# Patient Record
Sex: FEMALE | Race: WHITE | Employment: OTHER | URBAN - METROPOLITAN AREA
[De-identification: names, ages, dates, MRNs, and addresses within clinical notes are randomized per-mention and may not be internally consistent; named-entity substitution may affect disease eponyms.]

---

## 2017-06-22 ENCOUNTER — HOSPITAL ENCOUNTER (OUTPATIENT)
Dept: OTHER | Age: 67
Discharge: HOME | End: 2017-06-22
Attending: FAMILY MEDICINE | Admitting: CLINIC/CENTER

## 2017-06-25 LAB
CULTURE: NORMAL
ORGANISM: NORMAL
REPORT STATUS: NORMAL
REQUEST PROBLEM: NORMAL
SPECIMEN: NORMAL
TOTAL COLONY COUNT: NORMAL

## 2017-08-29 ENCOUNTER — TELEPHONE (OUTPATIENT)
Dept: INTERNAL MEDICINE CLINIC | Age: 67
End: 2017-08-29

## 2017-08-31 ENCOUNTER — OFFICE VISIT (OUTPATIENT)
Dept: PHYSICAL MEDICINE AND REHAB | Age: 67
End: 2017-08-31

## 2017-08-31 DIAGNOSIS — R20.2 PARESTHESIA AND PAIN OF BOTH UPPER EXTREMITIES: ICD-10-CM

## 2017-08-31 DIAGNOSIS — M79.601 PARESTHESIA AND PAIN OF BOTH UPPER EXTREMITIES: ICD-10-CM

## 2017-08-31 DIAGNOSIS — G56.02 CARPAL TUNNEL SYNDROME OF LEFT WRIST: Primary | ICD-10-CM

## 2017-08-31 DIAGNOSIS — M79.602 PARESTHESIA AND PAIN OF BOTH UPPER EXTREMITIES: ICD-10-CM

## 2017-08-31 DIAGNOSIS — R29.898 LEFT HAND WEAKNESS: ICD-10-CM

## 2017-08-31 PROCEDURE — 95886 MUSC TEST DONE W/N TEST COMP: CPT | Performed by: PHYSICAL MEDICINE & REHABILITATION

## 2017-08-31 PROCEDURE — 95909 NRV CNDJ TST 5-6 STUDIES: CPT | Performed by: PHYSICAL MEDICINE & REHABILITATION

## 2019-04-01 ENCOUNTER — HOSPITAL ENCOUNTER (OUTPATIENT)
Dept: CT IMAGING | Age: 69
Discharge: HOME | End: 2019-04-01
Payer: MEDICARE

## 2019-04-01 DIAGNOSIS — F03.90 SENILE DEMENTIA, UNCOMPLICATED (HCC): ICD-10-CM

## 2019-04-01 PROCEDURE — 70450 CT HEAD/BRAIN W/O DYE: CPT

## 2019-09-09 ENCOUNTER — TELEPHONE (OUTPATIENT)
Dept: BARIATRICS/WEIGHT MGMT | Age: 69
End: 2019-09-09

## 2019-09-19 ENCOUNTER — OFFICE VISIT (OUTPATIENT)
Dept: BARIATRICS/WEIGHT MGMT | Age: 69
End: 2019-09-19
Payer: MEDICARE

## 2019-09-19 VITALS
DIASTOLIC BLOOD PRESSURE: 70 MMHG | RESPIRATION RATE: 16 BRPM | WEIGHT: 220.2 LBS | SYSTOLIC BLOOD PRESSURE: 112 MMHG | HEIGHT: 61 IN | HEART RATE: 78 BPM | BODY MASS INDEX: 41.57 KG/M2

## 2019-09-19 DIAGNOSIS — E66.01 MORBID OBESITY WITH BMI OF 40.0-44.9, ADULT (HCC): Primary | ICD-10-CM

## 2019-09-19 PROCEDURE — 3017F COLORECTAL CA SCREEN DOC REV: CPT | Performed by: SURGERY

## 2019-09-19 PROCEDURE — 4040F PNEUMOC VAC/ADMIN/RCVD: CPT | Performed by: SURGERY

## 2019-09-19 PROCEDURE — 1123F ACP DISCUSS/DSCN MKR DOCD: CPT | Performed by: SURGERY

## 2019-09-19 PROCEDURE — G8400 PT W/DXA NO RESULTS DOC: HCPCS | Performed by: SURGERY

## 2019-09-19 PROCEDURE — G8427 DOCREV CUR MEDS BY ELIG CLIN: HCPCS | Performed by: SURGERY

## 2019-09-19 PROCEDURE — 1036F TOBACCO NON-USER: CPT | Performed by: SURGERY

## 2019-09-19 PROCEDURE — G8417 CALC BMI ABV UP PARAM F/U: HCPCS | Performed by: SURGERY

## 2019-09-19 PROCEDURE — 99204 OFFICE O/P NEW MOD 45 MIN: CPT | Performed by: SURGERY

## 2019-09-19 PROCEDURE — 1090F PRES/ABSN URINE INCON ASSESS: CPT | Performed by: SURGERY

## 2019-09-19 ASSESSMENT — ENCOUNTER SYMPTOMS
RESPIRATORY NEGATIVE: 1
ALLERGIC/IMMUNOLOGIC NEGATIVE: 1
CONSTIPATION: 1
BACK PAIN: 1

## 2019-09-19 NOTE — PROGRESS NOTES
Initial Bariatric Surgery Consultation History and Physical    Chief Complaint:  Class III Obesity (BMI greater than or equal to 40.0)    History of Present Illness: The patient is a 80 y.o. female being seen today for initial bariatric surgery consultation. The patient previously attended a bariatric surgery informational seminar on 9/5/19. The patient's PCP is Dr. Arcelia Moore.    The patient first recognized having issues with increased weight ~39-40 years ago. The patient identifies the following precipitants causing, or contributing to, weight gain: After having her two children is when the weight started staying on. Pt also states when she is stressed she \"turns to food. \" Also, family-related health issues (with her mother and father), and her divorce. The patient estimates the lowest weight in the past five years is approximately 175 pounds. The patient estimates the highest weight in the past five years is approximately 220 pounds. The patient's current weight is 220 pounds, while the current BMI is Body mass index is 41.61 kg/m². The patient has not had previous bariatric surgery. The patient has tried the following diet(s):  calorie counting, high protein diet, low carbohydrate diet, portion control, Weight Watchers, \"diet workshop,\" cabbage soup diet. The patient has tried the following over-the-counter drugs and/or prescription weight loss medications: belviq, contrave, and qysmia. Pt also reports trying multiple OTC weight loss supplements and herbs. The patient has tried the following physical activities/exercies: Walking. The patient was  successful with previous dietary, medication, and/or physical activity for sustained weight loss. In 1998 pt states she had a significant amount of weight loss that she attributes to walking and eating \"portion control. \"    In reviewing the patient's typical daily diet, it consists of the following:   Breakfast: An egg, toast or bowl of was counseling on diet, nutrition, surgical options, and education as above documented in my note.     Electronically signed by Beverly Vitale II, MD on 9/19/2019 at 2:38 PM

## 2019-10-01 ENCOUNTER — OFFICE VISIT (OUTPATIENT)
Dept: BARIATRICS/WEIGHT MGMT | Age: 69
End: 2019-10-01

## 2019-10-01 VITALS — BODY MASS INDEX: 41.39 KG/M2 | WEIGHT: 219.2 LBS | HEIGHT: 61 IN

## 2019-10-01 DIAGNOSIS — E66.01 MORBID OBESITY WITH BMI OF 40.0-44.9, ADULT (HCC): Primary | ICD-10-CM

## 2019-10-01 PROCEDURE — 99999 PR OFFICE/OUTPT VISIT,PROCEDURE ONLY: CPT

## 2019-10-09 ENCOUNTER — OFFICE VISIT (OUTPATIENT)
Dept: BARIATRICS/WEIGHT MGMT | Age: 69
End: 2019-10-09

## 2019-10-09 VITALS
DIASTOLIC BLOOD PRESSURE: 75 MMHG | BODY MASS INDEX: 40.33 KG/M2 | SYSTOLIC BLOOD PRESSURE: 145 MMHG | HEART RATE: 70 BPM | WEIGHT: 213.6 LBS | HEIGHT: 61 IN

## 2019-10-09 DIAGNOSIS — E66.01 MORBID OBESITY WITH BMI OF 40.0-44.9, ADULT (HCC): Primary | ICD-10-CM

## 2019-10-09 PROCEDURE — 99999 PR OFFICE/OUTPT VISIT,PROCEDURE ONLY: CPT

## 2019-10-16 ENCOUNTER — OFFICE VISIT (OUTPATIENT)
Dept: BARIATRICS/WEIGHT MGMT | Age: 69
End: 2019-10-16

## 2019-10-16 VITALS
BODY MASS INDEX: 40.02 KG/M2 | SYSTOLIC BLOOD PRESSURE: 136 MMHG | WEIGHT: 212 LBS | HEIGHT: 61 IN | HEART RATE: 76 BPM | DIASTOLIC BLOOD PRESSURE: 82 MMHG

## 2019-10-16 DIAGNOSIS — E66.01 MORBID OBESITY WITH BMI OF 40.0-44.9, ADULT (HCC): Primary | ICD-10-CM

## 2019-10-16 PROCEDURE — 99999 PR OFFICE/OUTPT VISIT,PROCEDURE ONLY: CPT

## 2019-10-23 ENCOUNTER — OFFICE VISIT (OUTPATIENT)
Dept: BARIATRICS/WEIGHT MGMT | Age: 69
End: 2019-10-23

## 2019-10-23 VITALS
DIASTOLIC BLOOD PRESSURE: 85 MMHG | HEIGHT: 61 IN | BODY MASS INDEX: 39.91 KG/M2 | SYSTOLIC BLOOD PRESSURE: 159 MMHG | HEART RATE: 80 BPM | WEIGHT: 211.4 LBS

## 2019-10-23 DIAGNOSIS — E66.9 OBESITY (BMI 30-39.9): Primary | ICD-10-CM

## 2019-10-23 PROCEDURE — 99999 PR OFFICE/OUTPT VISIT,PROCEDURE ONLY: CPT

## 2019-11-01 ENCOUNTER — OFFICE VISIT (OUTPATIENT)
Dept: BARIATRICS/WEIGHT MGMT | Age: 69
End: 2019-11-01

## 2019-11-01 VITALS
HEIGHT: 61 IN | SYSTOLIC BLOOD PRESSURE: 133 MMHG | HEART RATE: 73 BPM | BODY MASS INDEX: 40.17 KG/M2 | WEIGHT: 212.8 LBS | DIASTOLIC BLOOD PRESSURE: 77 MMHG

## 2019-11-01 DIAGNOSIS — E66.01 MORBID OBESITY WITH BMI OF 40.0-44.9, ADULT (HCC): Primary | ICD-10-CM

## 2019-11-01 PROCEDURE — 99999 PR OFFICE/OUTPT VISIT,PROCEDURE ONLY: CPT

## 2019-11-06 ENCOUNTER — OFFICE VISIT (OUTPATIENT)
Dept: BARIATRICS/WEIGHT MGMT | Age: 69
End: 2019-11-06

## 2019-11-06 VITALS
HEIGHT: 61 IN | BODY MASS INDEX: 40.25 KG/M2 | DIASTOLIC BLOOD PRESSURE: 75 MMHG | HEART RATE: 75 BPM | WEIGHT: 213.2 LBS | SYSTOLIC BLOOD PRESSURE: 135 MMHG

## 2019-11-06 DIAGNOSIS — E66.01 MORBID OBESITY WITH BMI OF 40.0-44.9, ADULT (HCC): Primary | ICD-10-CM

## 2019-11-06 PROCEDURE — 99999 PR OFFICE/OUTPT VISIT,PROCEDURE ONLY: CPT

## 2019-11-13 ENCOUNTER — OFFICE VISIT (OUTPATIENT)
Dept: BARIATRICS/WEIGHT MGMT | Age: 69
End: 2019-11-13

## 2019-11-13 VITALS
BODY MASS INDEX: 39.46 KG/M2 | SYSTOLIC BLOOD PRESSURE: 141 MMHG | HEIGHT: 61 IN | HEART RATE: 84 BPM | DIASTOLIC BLOOD PRESSURE: 84 MMHG | WEIGHT: 209 LBS

## 2019-11-13 DIAGNOSIS — E66.9 OBESITY (BMI 30-39.9): Primary | ICD-10-CM

## 2019-11-13 PROCEDURE — 99999 PR OFFICE/OUTPT VISIT,PROCEDURE ONLY: CPT

## 2019-11-20 ENCOUNTER — OFFICE VISIT (OUTPATIENT)
Dept: BARIATRICS/WEIGHT MGMT | Age: 69
End: 2019-11-20

## 2019-11-20 VITALS
HEART RATE: 66 BPM | WEIGHT: 209.8 LBS | DIASTOLIC BLOOD PRESSURE: 70 MMHG | SYSTOLIC BLOOD PRESSURE: 149 MMHG | HEIGHT: 61 IN | BODY MASS INDEX: 39.61 KG/M2

## 2019-11-20 DIAGNOSIS — E66.9 OBESITY (BMI 30-39.9): Primary | ICD-10-CM

## 2019-11-20 PROCEDURE — 99999 PR OFFICE/OUTPT VISIT,PROCEDURE ONLY: CPT

## 2019-11-27 ENCOUNTER — OFFICE VISIT (OUTPATIENT)
Dept: BARIATRICS/WEIGHT MGMT | Age: 69
End: 2019-11-27

## 2019-11-27 VITALS
DIASTOLIC BLOOD PRESSURE: 78 MMHG | WEIGHT: 210.6 LBS | HEIGHT: 61 IN | BODY MASS INDEX: 39.76 KG/M2 | HEART RATE: 68 BPM | SYSTOLIC BLOOD PRESSURE: 139 MMHG

## 2019-11-27 DIAGNOSIS — E66.9 OBESITY (BMI 30-39.9): Primary | ICD-10-CM

## 2019-11-27 PROCEDURE — 99999 PR OFFICE/OUTPT VISIT,PROCEDURE ONLY: CPT

## 2019-12-04 ENCOUNTER — OFFICE VISIT (OUTPATIENT)
Dept: BARIATRICS/WEIGHT MGMT | Age: 69
End: 2019-12-04

## 2019-12-04 VITALS
BODY MASS INDEX: 39.53 KG/M2 | SYSTOLIC BLOOD PRESSURE: 174 MMHG | HEIGHT: 61 IN | WEIGHT: 209.4 LBS | HEART RATE: 77 BPM | DIASTOLIC BLOOD PRESSURE: 77 MMHG

## 2019-12-04 DIAGNOSIS — E66.9 OBESITY (BMI 30-39.9): Primary | ICD-10-CM

## 2019-12-04 PROCEDURE — 99999 PR OFFICE/OUTPT VISIT,PROCEDURE ONLY: CPT

## 2019-12-11 ENCOUNTER — OFFICE VISIT (OUTPATIENT)
Dept: BARIATRICS/WEIGHT MGMT | Age: 69
End: 2019-12-11

## 2019-12-11 VITALS
WEIGHT: 208.6 LBS | HEART RATE: 72 BPM | DIASTOLIC BLOOD PRESSURE: 75 MMHG | BODY MASS INDEX: 39.38 KG/M2 | SYSTOLIC BLOOD PRESSURE: 159 MMHG | HEIGHT: 61 IN

## 2019-12-11 DIAGNOSIS — E66.9 OBESITY (BMI 30-39.9): Primary | ICD-10-CM

## 2019-12-11 PROCEDURE — 99999 PR OFFICE/OUTPT VISIT,PROCEDURE ONLY: CPT

## 2019-12-18 ENCOUNTER — OFFICE VISIT (OUTPATIENT)
Dept: BARIATRICS/WEIGHT MGMT | Age: 69
End: 2019-12-18

## 2019-12-18 VITALS
SYSTOLIC BLOOD PRESSURE: 145 MMHG | WEIGHT: 209.8 LBS | HEART RATE: 80 BPM | HEIGHT: 61 IN | DIASTOLIC BLOOD PRESSURE: 81 MMHG | BODY MASS INDEX: 39.61 KG/M2

## 2019-12-18 DIAGNOSIS — E66.9 OBESITY (BMI 30-39.9): Primary | ICD-10-CM

## 2019-12-18 PROCEDURE — 99999 PR OFFICE/OUTPT VISIT,PROCEDURE ONLY: CPT

## 2020-01-08 ENCOUNTER — OFFICE VISIT (OUTPATIENT)
Dept: BARIATRICS/WEIGHT MGMT | Age: 70
End: 2020-01-08

## 2020-01-08 VITALS
WEIGHT: 210 LBS | HEART RATE: 73 BPM | HEIGHT: 61 IN | SYSTOLIC BLOOD PRESSURE: 158 MMHG | BODY MASS INDEX: 39.65 KG/M2 | DIASTOLIC BLOOD PRESSURE: 78 MMHG

## 2020-01-08 PROCEDURE — 99999 PR OFFICE/OUTPT VISIT,PROCEDURE ONLY: CPT

## 2020-01-13 ENCOUNTER — OFFICE VISIT (OUTPATIENT)
Dept: BARIATRICS/WEIGHT MGMT | Age: 70
End: 2020-01-13
Payer: MEDICARE

## 2020-01-13 VITALS
RESPIRATION RATE: 16 BRPM | HEIGHT: 61 IN | BODY MASS INDEX: 40.76 KG/M2 | DIASTOLIC BLOOD PRESSURE: 78 MMHG | SYSTOLIC BLOOD PRESSURE: 122 MMHG | WEIGHT: 215.9 LBS | HEART RATE: 72 BPM

## 2020-01-13 PROCEDURE — 1123F ACP DISCUSS/DSCN MKR DOCD: CPT | Performed by: NURSE PRACTITIONER

## 2020-01-13 PROCEDURE — G8427 DOCREV CUR MEDS BY ELIG CLIN: HCPCS | Performed by: NURSE PRACTITIONER

## 2020-01-13 PROCEDURE — 4040F PNEUMOC VAC/ADMIN/RCVD: CPT | Performed by: NURSE PRACTITIONER

## 2020-01-13 PROCEDURE — 3017F COLORECTAL CA SCREEN DOC REV: CPT | Performed by: NURSE PRACTITIONER

## 2020-01-13 PROCEDURE — 99204 OFFICE O/P NEW MOD 45 MIN: CPT | Performed by: NURSE PRACTITIONER

## 2020-01-13 PROCEDURE — 1036F TOBACCO NON-USER: CPT | Performed by: NURSE PRACTITIONER

## 2020-01-13 PROCEDURE — G8400 PT W/DXA NO RESULTS DOC: HCPCS | Performed by: NURSE PRACTITIONER

## 2020-01-13 PROCEDURE — G8484 FLU IMMUNIZE NO ADMIN: HCPCS | Performed by: NURSE PRACTITIONER

## 2020-01-13 PROCEDURE — G8417 CALC BMI ABV UP PARAM F/U: HCPCS | Performed by: NURSE PRACTITIONER

## 2020-01-13 PROCEDURE — 1090F PRES/ABSN URINE INCON ASSESS: CPT | Performed by: NURSE PRACTITIONER

## 2020-01-13 RX ORDER — SENNOSIDES 8.6 MG
TABLET ORAL
COMMUNITY
End: 2020-08-20

## 2020-01-13 RX ORDER — ERGOCALCIFEROL (VITAMIN D2) 50 MCG
1000 CAPSULE ORAL DAILY
COMMUNITY

## 2020-01-13 NOTE — PROGRESS NOTES
Parkinsonism Father        Social History:  Social History     Socioeconomic History    Marital status:      Spouse name: Not on file    Number of children: Not on file    Years of education: Not on file    Highest education level: Not on file   Occupational History    Not on file   Social Needs    Financial resource strain: Not on file    Food insecurity:     Worry: Not on file     Inability: Not on file    Transportation needs:     Medical: Not on file     Non-medical: Not on file   Tobacco Use    Smoking status: Never Smoker    Smokeless tobacco: Never Used   Substance and Sexual Activity    Alcohol use: No    Drug use: Never    Sexual activity: Not on file   Lifestyle    Physical activity:     Days per week: Not on file     Minutes per session: Not on file    Stress: Not on file   Relationships    Social connections:     Talks on phone: Not on file     Gets together: Not on file     Attends [employer] service: Not on file     Active member of club or organization: Not on file     Attends meetings of clubs or organizations: Not on file     Relationship status: Not on file    Intimate partner violence:     Fear of current or ex partner: Not on file     Emotionally abused: Not on file     Physically abused: Not on file     Forced sexual activity: Not on file   Other Topics Concern    Not on file   Social History Narrative    Not on file       Review of Systems - History obtained from the patient. Review of Systems - General ROS: negative for - chills, fever, hot flashes or night sweats  ENT ROS: negative for - headaches, oral lesions, sore throat, vertigo or visual changes  Allergy and Immunology ROS: negative for - hives or nasal congestion  Endocrine ROS: negative for - hair pattern changes, mood swings or polydipsia/polyuria  Respiratory ROS: no cough, shortness of breath, or wheezing  Cardiovascular ROS: no chest pain or dyspnea on exertion, +HTN.   Gastrointestinal ROS: no abdominal Saxenda, chemically modified version of human GLP-1 ( more weight loss than orlistat on trials). Better for patients with CVD and Type 2 DM, not on insulin. Norberto Machado is a good candidate for Saxenda weight loss medication, along with nutrition consult and detailed diet plan. Patient was given Saxenda weight loss medication handout today. Patient here today to discuss candidacy for weight loss medication: BMI of >30 at 40. Patient has failed to lose 5% of body weight for 20 years (minimum of 3 months). Dose: Initial 0.6 mg SUBQ. Increase weekly 1.2, 1.8, and 2.4 mg until recommend dose of 3 mg daily. Re-evaluate after 16 weeks. Discussed side effects in detail, most common but not limited to; diarrhea, constipation, mood changes, kidney problems, hypoglycemia (most common if used in conjunction with DM medications), increase lipase, increase heart rate. Aware to call with ANY side effects. Plan    1. Obesity (BMI 30-39.9)  - See below. 2. Essential hypertension  - HTN stable, will monitor weekly once starting saxenda. - Continue norvasc and diovan. - PCP managing.     - Discussed weight loss program with patient and the metabolic components of obesity and insulin resistance over time. - Ultimately will need to change overall eating behaviors to have success in continued management with weight loss. - Will continue to journal food items and discussed the below recommendations to patient. - Planning saxenda, Follow up for new medication group class this week, just need lab records prior. 4 wks with NP for saxenda follow up. Patient was encouraged to journal all food intake using Hundo pal and following with RD.      I spent 45 minutes today and more than 50% of the office visit today was spent in face to face counseling regarding diet and exercise, saxenda sheet and saxenda video, demo pen, counting calories, complying with the diet recommendations, and complying with the work up of dietary counseling. Patient counseled on the benefits of treatment plan at length while in the office today. Patient states an understanding and willingness to proceed with the recommended weight loss plan. No orders of the defined types were placed in this encounter. No orders of the defined types were placed in this encounter. Follow Up:  Return for New Medication Group Class.     Crescencio Childs CNP

## 2020-01-15 ENCOUNTER — OFFICE VISIT (OUTPATIENT)
Dept: BARIATRICS/WEIGHT MGMT | Age: 70
End: 2020-01-15

## 2020-01-15 ENCOUNTER — OFFICE VISIT (OUTPATIENT)
Dept: BARIATRICS/WEIGHT MGMT | Age: 70
End: 2020-01-15
Payer: MEDICARE

## 2020-01-15 VITALS
BODY MASS INDEX: 39.69 KG/M2 | SYSTOLIC BLOOD PRESSURE: 125 MMHG | HEART RATE: 86 BPM | HEIGHT: 61 IN | DIASTOLIC BLOOD PRESSURE: 66 MMHG | WEIGHT: 210.2 LBS

## 2020-01-15 VITALS
BODY MASS INDEX: 39.69 KG/M2 | WEIGHT: 210.2 LBS | HEIGHT: 61 IN | SYSTOLIC BLOOD PRESSURE: 125 MMHG | DIASTOLIC BLOOD PRESSURE: 66 MMHG | HEART RATE: 86 BPM

## 2020-01-15 PROCEDURE — G8417 CALC BMI ABV UP PARAM F/U: HCPCS | Performed by: NURSE PRACTITIONER

## 2020-01-15 PROCEDURE — G8484 FLU IMMUNIZE NO ADMIN: HCPCS | Performed by: NURSE PRACTITIONER

## 2020-01-15 PROCEDURE — G8428 CUR MEDS NOT DOCUMENT: HCPCS | Performed by: NURSE PRACTITIONER

## 2020-01-15 PROCEDURE — 99215 OFFICE O/P EST HI 40 MIN: CPT | Performed by: NURSE PRACTITIONER

## 2020-01-15 PROCEDURE — 3017F COLORECTAL CA SCREEN DOC REV: CPT | Performed by: NURSE PRACTITIONER

## 2020-01-15 PROCEDURE — 4040F PNEUMOC VAC/ADMIN/RCVD: CPT | Performed by: NURSE PRACTITIONER

## 2020-01-15 PROCEDURE — 99999 PR OFFICE/OUTPT VISIT,PROCEDURE ONLY: CPT

## 2020-01-15 PROCEDURE — G8400 PT W/DXA NO RESULTS DOC: HCPCS | Performed by: NURSE PRACTITIONER

## 2020-01-15 PROCEDURE — 1036F TOBACCO NON-USER: CPT | Performed by: NURSE PRACTITIONER

## 2020-01-15 PROCEDURE — 1123F ACP DISCUSS/DSCN MKR DOCD: CPT | Performed by: NURSE PRACTITIONER

## 2020-01-15 PROCEDURE — 1090F PRES/ABSN URINE INCON ASSESS: CPT | Performed by: NURSE PRACTITIONER

## 2020-01-15 RX ORDER — GLUCOSAMINE HCL/CHONDROITIN SU 500-400 MG
CAPSULE ORAL
Qty: 100 EACH | Refills: 0 | Status: SHIPPED | OUTPATIENT
Start: 2020-01-15

## 2020-01-15 ASSESSMENT — ENCOUNTER SYMPTOMS
ABDOMINAL PAIN: 0
WHEEZING: 0
SHORTNESS OF BREATH: 0
RHINORRHEA: 0
TROUBLE SWALLOWING: 0
ABDOMINAL DISTENTION: 0
CHEST TIGHTNESS: 0
EYE PAIN: 0
NAUSEA: 0
DIARRHEA: 0
BACK PAIN: 1

## 2020-01-15 NOTE — PROGRESS NOTES
Nahum Dash  1950  77 y.o. SUBJECT FARTUN:    Chief Complaint   Patient presents with    Weight Management     Medication Group Class     Past Medical History:   Diagnosis Date    Hyperlipidemia     Hypertension     Nocturia      Past Surgical History:   Procedure Laterality Date    CARPAL TUNNEL RELEASE Bilateral     FRACTURE SURGERY      Left ankle     HERNIA REPAIR  1998    [facility] (Dr Mariluz Hathaway)    TUBAL LIGATION       Current Outpatient Medications   Medication Sig Dispense Refill    Insulin Pen Needle 32G X 6 MM MISC Dispense 1, 50 pack box. 1 each 0    Alcohol Swabs 70 % PADS daily 100 each 0    liraglutide-weight management (SAXENDA) 18 MG/3ML SOPN Wk 1 0.6 mg, wk 2 1.2mg, wk 3 1.8mg, wk 4 2.4mg and wk 5 on 3 mg. 2 pen 0    Vitamin D, Ergocalciferol, 50 MCG (2000 UT) CAPS Take 1,000 Units by mouth Daily      aspirin 81 MG tablet Take 81 mg by mouth daily      Coenzyme Q10 (COQ-10) 400 MG CAPS Take by mouth      amLODIPine (NORVASC) 10 MG tablet Take 10 mg by mouth daily      valsartan (DIOVAN) 80 MG tablet Take 80 mg by mouth daily      rosuvastatin (CRESTOR) 5 MG tablet Take 5 mg by mouth daily       No current facility-administered medications for this visit. Family History   Problem Relation Age of Onset    Hypertension Mother     Stroke Mother     Parkinsonism Father        HPI  HPI: Nahum Dash presents today for new medication group class by this provider. Patient had an initial individual provider consult and is here for their group education class. At initial consult weight loss medication saxenda was decided based on current BMI, prior attempts, and exclusion criteria based on past medical history. No new medications, recent illnesses or new medical history. HTN stable today, will monitor through program. Labs reviewed, pre dm noted. Review of Systems   Constitutional: Negative. Negative for appetite change, fatigue and fever.    HENT: Negative for Palpations: Abdomen is soft. Tenderness: There is no tenderness. Musculoskeletal: Normal range of motion. General: No tenderness. Comments: In all 4 extremities. Skin:     General: Skin is warm and dry. Findings: No rash. Neurological:      Mental Status: She is alert and oriented to person, place, and time. GCS: GCS eye subscore is 4. GCS verbal subscore is 5. GCS motor subscore is 6. Motor: No abnormal muscle tone. Psychiatric:         Behavior: Behavior normal.         Judgment: Judgment normal.         ASSESSMENT/ PLAN:    1. Obesity (BMI 30-39. 9)  See below. - Insulin Pen Needle 32G X 6 MM MISC; Dispense 1, 50 pack box. Dispense: 1 each; Refill: 0  - Alcohol Swabs 70 % PADS; daily  Dispense: 100 each; Refill: 0  - liraglutide-weight management (SAXENDA) 18 MG/3ML SOPN; Wk 1 0.6 mg, wk 2 1.2mg, wk 3 1.8mg, wk 4 2.4mg and wk 5 on 3 mg. Dispense: 2 pen; Refill: 0    2. Essential hypertension  - HTN, stable on medication. Will monitor weekly. - Continue Norvasc, Diovan. - PCP managing. 3. Medication management  - Enrolled in weight loss medication program.     - Patient attended weight loss medication group class today. - Patient given prescription today to start [address]. - Discussed possible side effects with patient in length via power point.    - Will monitor weight and vital signs weekly. Patient informed of importance and compliance with weekly weight and vital checks. - Will have vital check weekly and RTC in 1 month with NP.     - Obesity with a BMI of 39.  - Patient educated in depth on defining obesity and the risk factors associated when BMI >30 via power point.     - Recommend MVI Ca/Vit D daily.   - Pt instructed on healthy diet to support weight loss. Instructed on goal calorie intake, not less than 1,000 kcals daily.  Educated on healthy diet framework to include adequate lean protein, non-starchy vegetables, and moderate carbohydrate and fruit intake. - Instructed on fluid intake at least 64 oz daily. Patient instructed to refrain from any calorie enriched drinks. Recommend using meal replacements, 1-3 daily to support maintaining adequate protein and calorie goals. - Patient also educated on celebrate products sold in house and appropriate recommendations for vitamins and meal replacement shakes. - Patient was also informed on weight loss program and specific requirements to be met by all. Weight loss program contract signed prior. Patient aware of the medication compliance guidelines and will be removed from medication if the patient does not comply and medication handout given today. Patient was seen with total face to face time of 40 minutes in a group setting. More than 50% of this visit was counseling on obesity, strict diet recommendations, exercise, current medication usage, possible side effects, nutrition and education as above in my assessment and plan section of my note. No orders of the defined types were placed in this encounter. Return in about 1 month (around 2/15/2020).     Selam Vidal CNP

## 2020-08-13 ENCOUNTER — TELEPHONE (OUTPATIENT)
Dept: CARDIOLOGY CLINIC | Age: 70
End: 2020-08-13

## 2020-08-20 ENCOUNTER — INITIAL CONSULT (OUTPATIENT)
Dept: CARDIOLOGY CLINIC | Age: 70
End: 2020-08-20
Payer: MEDICARE

## 2020-08-20 ENCOUNTER — NURSE ONLY (OUTPATIENT)
Dept: CARDIOLOGY CLINIC | Age: 70
End: 2020-08-20
Payer: MEDICARE

## 2020-08-20 VITALS
HEART RATE: 76 BPM | DIASTOLIC BLOOD PRESSURE: 68 MMHG | SYSTOLIC BLOOD PRESSURE: 130 MMHG | BODY MASS INDEX: 43.56 KG/M2 | WEIGHT: 221.9 LBS | HEIGHT: 60 IN

## 2020-08-20 PROCEDURE — G8417 CALC BMI ABV UP PARAM F/U: HCPCS | Performed by: INTERNAL MEDICINE

## 2020-08-20 PROCEDURE — 1090F PRES/ABSN URINE INCON ASSESS: CPT | Performed by: INTERNAL MEDICINE

## 2020-08-20 PROCEDURE — 99204 OFFICE O/P NEW MOD 45 MIN: CPT | Performed by: INTERNAL MEDICINE

## 2020-08-20 PROCEDURE — 93000 ELECTROCARDIOGRAM COMPLETE: CPT | Performed by: INTERNAL MEDICINE

## 2020-08-20 PROCEDURE — G8427 DOCREV CUR MEDS BY ELIG CLIN: HCPCS | Performed by: INTERNAL MEDICINE

## 2020-08-20 PROCEDURE — 93225 XTRNL ECG REC<48 HRS REC: CPT | Performed by: INTERNAL MEDICINE

## 2020-08-20 RX ORDER — LOSARTAN POTASSIUM 50 MG/1
TABLET ORAL
COMMUNITY
Start: 2020-08-12 | End: 2021-01-04

## 2020-08-20 RX ORDER — BIOTIN 1 MG
TABLET ORAL
COMMUNITY

## 2020-08-20 RX ORDER — ASCORBIC ACID 500 MG
500 TABLET ORAL DAILY
COMMUNITY

## 2020-08-20 NOTE — PROGRESS NOTES
CARDIOLOGY CONSULT NOTE   Reason for consultation:  Cardiac risk counseling, chest pain    Referring physician:  Dr. Raymon Monahan    Primary care physician: Bre Clark MD      Dear Dr. Raymon Monahan  Thanks for the consult. History of present illness:Brittany is a 77 y. o.year old who  presents for cardiac evaluation, her brother had 3 blockage on  maker, she wants to get check out, she does feel some  chest pain for last few years, happening daily, intermittent for 15 to 20 mins and aggravated with activity substernal also,reproducible with palpation, radiated to shoulder, 6/10, tender to touch,associated with shortness of breath, + sweating, nausea, she also feels some times left arm pain. She some times felt palpitations  She was told to have 3 leaky valve  Blood pressure, cholesterol, blood glucose and weight are well controlled. Past medical history:    has a past medical history of Hyperlipidemia, Hypertension, and Nocturia. Past surgical history:   has a past surgical history that includes fracture surgery; Tubal ligation; hernia repair (1998); and Carpal tunnel release (Bilateral). Social History:   reports that she has never smoked. She has never used smokeless tobacco. She reports that she does not drink alcohol or use drugs. Family history:   no family history of CAD, STROKE of DM    No Known Allergies    No current facility-administered medications for this visit. Current Outpatient Medications   Medication Sig Dispense Refill    losartan (COZAAR) 50 MG tablet TAKE ONE TABLET BY MOUTH EVERY DAY FOR 10 DAYS      Biotin 1000 MCG TABS Take by mouth      [employer] FIBER GUMMIES PO Take by mouth      vitamin C (ASCORBIC ACID) 500 MG tablet Take 500 mg by mouth daily      Melatonin 5 MG CAPS Take by mouth      Insulin Pen Needle 32G X 6 MM MISC Dispense 1, 50 pack box.  1 each 0    Alcohol Swabs 70 % PADS daily 100 each 0    Vitamin D, Ergocalciferol, 50 MCG (2000 UT) CAPS Take 1,000 Units by mouth Daily      aspirin 81 MG tablet Take 81 mg by mouth daily      amLODIPine (NORVASC) 10 MG tablet Take 10 mg by mouth daily      rosuvastatin (CRESTOR) 5 MG tablet Take 5 mg by mouth daily       No current facility-administered medications for this visit. Review of Systems:   · Constitutional: No Fever or Weight Loss   · Eyes: No Decreased Vision  · ENT: No Headaches, Hearing Loss or Vertigo  · Cardiovascular: +chest pain, dyspnea on exertion, palpitations or loss of consciousness  · Respiratory: No cough or wheezing    · Gastrointestinal: No abdominal pain, appetite loss, blood in stools, constipation, diarrhea or heartburn  · Genitourinary: No dysuria, trouble voiding, or hematuria  · Musculoskeletal: arthritis, No gait disturbance, weakness or joint complaints  · Integumentary: No rash or pruritis  · Neurological: No TIA or stroke symptoms  · Psychiatric: No anxiety or depression  · Endocrine: No malaise, fatigue or temperature intolerance  · Hematologic/Lymphatic: No bleeding problems, blood clots or swollen lymph nodes  · Allergic/Immunologic: No nasal congestion or hives  All systems negative except as marked. ·   ·      Physical Examination:    Vitals:    08/20/20 1042   BP: 130/68   Pulse: 76    rr 14  afebrile  Wt Readings from Last 3 Encounters:   08/20/20 221 lb 14.4 oz (100.7 kg)   01/15/20 210 lb 3.2 oz (95.3 kg)   01/15/20 210 lb 3.2 oz (95.3 kg)     Body mass index is 43.34 kg/m². General Appearance:  No distress, conversant    Constitutional:  Well developed, Well nourished, No acute distress, Non-toxic appearance. HENT:  Normocephalic, Atraumatic, Bilateral external ears normal, Oropharynx moist, No oral exudates, Nose normal. Neck- Normal range of motion, No tenderness, Supple, No stridor,no apical-carotid delay, no carotid bruit  Eyes:  PERRL, EOMI, Conjunctiva normal, No discharge. Respiratory:  Normal breath sounds, No respiratory distress, No wheezing, No chest tenderness. ,no use of accessory muscles, diaphragm movement is normal  Cardiovascular: (PMI) apex non displaced,no lifts no thrills, no s3,no s4, Normal heart rate, Normal rhythm, No murmurs, No rubs, No gallops. Carotid arteries pulse and amplitude are normal no bruit, no abdominal bruit noted ( normal abdominal aorta ausculation), femoral arteries pulse and amplitude are normal no bruit, pedal pulses are normal  GI:  Bowel sounds normal, Soft, No tenderness, No masses, No pulsatile masses, no hepatosplenomegally, no bruits  : External genitalia appear normal, No masses or lesions. No discharge. No CVA tenderness. Musculoskeletal:  Intact distal pulses, No edema, No tenderness, No cyanosis, No clubbing. Good range of motion in all major joints. No tenderness to palpation or major deformities noted. Back- No tenderness. Integument:  Warm, Dry, No erythema, No rash. Skin: no rash, no ulcers  Lymphatic:  No lymphadenopathy noted. Neurologic:  Alert & oriented x 3, Normal motor function, Normal sensory function, No focal deficits noted. Psychiatric:  Affect normal, Judgment normal, Mood normal.   Lab Review   No results for input(s): WBC, HGB, HCT, PLT in the last 72 hours. No results for input(s): NA, K, CL, CO2, PHOS, BUN, CREATININE in the last 72 hours. Invalid input(s): CA  No results for input(s): AST, ALT, ALB, BILIDIR, BILITOT, ALKPHOS in the last 72 hours. No results for input(s): TROPONINI in the last 72 hours. No results found for: BNP  No results found for: INR, PROTIME      EKG:nsr,m PVCs    Chest Xray:pending    ECHO:pending  Labs, echo, meds reviewed  Assessment: 77 y. o.year old with PMH of  has a past medical history of Hyperlipidemia, Hypertension, and Nocturia. Recommendations:    1. Chest pain: stress test, echo ordered, she had possible torn meniscus and hip arthritis, cannot do treadmill test, will get lexiscan stress test  2.  Palpitations: 24 hrs holter monitor ordered, check TSH, chem 7]  3. Valvular heart disease: check echo  4. Dyslipidemia: controlled, continue crestor  5. HTN: controlled, continue norvasc,   6. Health maintenance: exerise and diet  All labs, medications and tests reviewed, continue all other medications of all above medical condition listed as is.          Baron Patrica MD, 8/20/2020 11:11 AM

## 2020-08-20 NOTE — PROGRESS NOTES
24 hour holter monitor applied [email or web address] for chest pain Serial # [id number] . Instructed patient on monitor and proper use. Instructed on diary. When to remove and bring it back. Must leave the holter monitor on  without removing for the duration of time ordered. Answered all questions the patient had. Instructed [phone] with any questions or concerns with the monitor.

## 2020-08-20 NOTE — LETTER
Meri Vazquez  1950  [id number]    Have you had any Chest Pain - Yes  If Yes DO EKG - How does it feel - Sharp Pain   How long does the pain last - seconds   How long have you been having the pain - Years   Did you take a no       Have you had any Shortness of Breath - Yes  If Yes - When on exertion    Have you had any dizziness - Yes  If Yes DO ORTHOSTATIC BP - when do you feel dizzy can occur anytime pt states this only happens sometimes.    How long does it last Been having this for years    Have you had any palpitations - Yes  If Yes DO EKG - Do you feel your heart fluttering has had this for a couple of years  How long does it last - seconds     Is the patient on any of the following medications -     Do you have any edema - swelling in left foot pt states she has had this for a long time    If Yes - CHECK TO SEE IF THE EDEMA IS PITTING    Check Venous \"LEG PROBLEM Questionnaire\"    Do you have a surgery or procedure scheduled in the near future - No      Ask patient [facility] if [facility] are not already [facility]    Check to see if we have an E-MAIL on file for the patient    Check medication list thoroughly!!!  BE SURE TO ASK PATIENT IF THEY NEED MEDICATION REFILLS

## 2020-08-20 NOTE — PATIENT INSTRUCTIONS
Please hold on to these instructions the  will call you within 1-9 business days when we receive authorization from your insurance. Nuclear Stress Test    WHAT TO EXPECT:   ? You will need to confirm the test or it could be cancelled. ? This test will take approximately 2 hours: 1 hour in the AM &    1 hour in the PM. You will be given a time by the Technologist after the first part is completed to come back. ? You will be given a medication, through an IV in the hand, this will safely simulate exercise. This IV is also needed to inject the radioactive isotope unless you are able toe walk the treadmill. ? You will receive an injection in the AM & PM before the pictures. ? Using a special camera, you will have one set of pictures of your heart taken in the AM and a set of pictures in the PM.     PREPARATION FOR TEST:  ? Eat a light breakfast such as water or juice and toast.  ? If you are DIABETIC: Eat a normal breakfast with NO CAFFEINE and take your insulin as normal.   ? AVOID ALL FOODS & DRINKS containing CAFFEINE 12 HOURS PRIOR TO THE TEST: Including coffee, Tea, Meenu and other soft drinks even those labeled  caffeine free or decaffeinated.  ? HOLD THESE MEDICATIONS Persantine & Theophylline (Theodur)  24 hours prior & bring your inhaler with you. The physician will specify if the following Beta blockers need to be held for 24 hours prior to test:     Please hold on to these instructions the  will call you within 1-9 business days when we receive authorization from your insurance. Echocardiogram    WHAT TO EXPECT:   ? This test will take approximately 45 minutes. ? It is an ultrasound of the heart. ? It can look at the valves and chambers inside the heart   ? There is no special instructions for this test.     If you are unable to keep this appointment, please notify us 24 hours prior to test at [phone].

## 2020-08-21 ENCOUNTER — HOSPITAL ENCOUNTER (OUTPATIENT)
Age: 70
Discharge: HOME | End: 2020-08-21
Payer: MEDICARE

## 2020-08-21 LAB
ANION GAP SERPL CALCULATED.3IONS-SCNC: 8 MMOL/L (ref 4–16)
BUN BLDV-MCNC: 19 MG/DL (ref 6–23)
CALCIUM SERPL-MCNC: 10.4 MG/DL (ref 8.3–10.6)
CHLORIDE BLD-SCNC: 103 MMOL/L (ref 99–110)
CO2: 30 MMOL/L (ref 21–32)
CREAT SERPL-MCNC: 1 MG/DL (ref 0.6–1.1)
GFR AFRICAN AMERICAN: >60 ML/MIN/1.73M2
GFR NON-AFRICAN AMERICAN: 55 ML/MIN/1.73M2
GLUCOSE BLD-MCNC: 123 MG/DL (ref 70–99)
POTASSIUM SERPL-SCNC: 4.6 MMOL/L (ref 3.5–5.1)
SODIUM BLD-SCNC: 141 MMOL/L (ref 135–145)
T3 FREE: 2.8 PG/ML (ref 2.3–4.2)
T4 FREE: 1.12 NG/DL (ref 0.9–1.8)
TSH HIGH SENSITIVITY: 2.84 UIU/ML (ref 0.27–4.2)

## 2020-08-21 PROCEDURE — 36415 COLL VENOUS BLD VENIPUNCTURE: CPT

## 2020-08-21 PROCEDURE — 84439 ASSAY OF FREE THYROXINE: CPT

## 2020-08-21 PROCEDURE — 84443 ASSAY THYROID STIM HORMONE: CPT

## 2020-08-21 PROCEDURE — 84481 FREE ASSAY (FT-3): CPT

## 2020-08-21 PROCEDURE — 80048 BASIC METABOLIC PNL TOTAL CA: CPT

## 2020-09-03 ENCOUNTER — PROCEDURE VISIT (OUTPATIENT)
Dept: CARDIOLOGY CLINIC | Age: 70
End: 2020-09-03
Payer: MEDICARE

## 2020-09-03 LAB
LV EF: 69 %
LVEF MODALITY: NORMAL

## 2020-09-03 PROCEDURE — 78452 HT MUSCLE IMAGE SPECT MULT: CPT | Performed by: INTERNAL MEDICINE

## 2020-09-03 PROCEDURE — A9500 TC99M SESTAMIBI: HCPCS | Performed by: INTERNAL MEDICINE

## 2020-09-03 PROCEDURE — 93015 CV STRESS TEST SUPVJ I&R: CPT | Performed by: INTERNAL MEDICINE

## 2020-09-04 ENCOUNTER — TELEPHONE (OUTPATIENT)
Dept: CARDIOLOGY CLINIC | Age: 70
End: 2020-09-04

## 2020-09-04 NOTE — TELEPHONE ENCOUNTER
Advised patient of results. Will have scheduling call patient on Tuesday after the holiday to schedule f/u OV with Kaley. Patient also has ECHO scheduled next week. She is requesting a female tech for the test.    Supervising physician Dr. Roark Klinefelter . Abnormal study. Left ventricular perfusion is abnormal suggesting mild degree of lateral wall ischemia. Left ventricular function is normal with an ejection fraction of 69% . Exercise tolerance is good as patient exercised for 6 minutes on standard Macario protocol. Appropriate blood pressure response to exercise is noted. Exercise-induced frequent PVCs and ventricular couplets noted. Recommend follow up office visit to discuss the results and further recommendations.

## 2020-09-11 ENCOUNTER — PROCEDURE VISIT (OUTPATIENT)
Dept: CARDIOLOGY CLINIC | Age: 70
End: 2020-09-11
Payer: MEDICARE

## 2020-09-11 LAB
LV EF: 58 %
LVEF MODALITY: NORMAL

## 2020-09-11 PROCEDURE — 93306 TTE W/DOPPLER COMPLETE: CPT | Performed by: INTERNAL MEDICINE

## 2020-09-14 ENCOUNTER — TELEPHONE (OUTPATIENT)
Dept: CARDIOLOGY CLINIC | Age: 70
End: 2020-09-14

## 2020-10-09 PROCEDURE — 93227 XTRNL ECG REC<48 HR R&I: CPT | Performed by: INTERNAL MEDICINE

## 2020-10-12 ENCOUNTER — OFFICE VISIT (OUTPATIENT)
Dept: CARDIOLOGY CLINIC | Age: 70
End: 2020-10-12
Payer: MEDICARE

## 2020-10-12 VITALS
WEIGHT: 225.8 LBS | BODY MASS INDEX: 44.1 KG/M2 | DIASTOLIC BLOOD PRESSURE: 82 MMHG | HEART RATE: 68 BPM | TEMPERATURE: 98.2 F | SYSTOLIC BLOOD PRESSURE: 128 MMHG

## 2020-10-12 PROCEDURE — 99214 OFFICE O/P EST MOD 30 MIN: CPT | Performed by: INTERNAL MEDICINE

## 2020-10-12 PROCEDURE — 4040F PNEUMOC VAC/ADMIN/RCVD: CPT | Performed by: INTERNAL MEDICINE

## 2020-10-12 PROCEDURE — G8484 FLU IMMUNIZE NO ADMIN: HCPCS | Performed by: INTERNAL MEDICINE

## 2020-10-12 PROCEDURE — G8427 DOCREV CUR MEDS BY ELIG CLIN: HCPCS | Performed by: INTERNAL MEDICINE

## 2020-10-12 PROCEDURE — G8400 PT W/DXA NO RESULTS DOC: HCPCS | Performed by: INTERNAL MEDICINE

## 2020-10-12 PROCEDURE — 3017F COLORECTAL CA SCREEN DOC REV: CPT | Performed by: INTERNAL MEDICINE

## 2020-10-12 PROCEDURE — 1036F TOBACCO NON-USER: CPT | Performed by: INTERNAL MEDICINE

## 2020-10-12 PROCEDURE — G8417 CALC BMI ABV UP PARAM F/U: HCPCS | Performed by: INTERNAL MEDICINE

## 2020-10-12 PROCEDURE — 1123F ACP DISCUSS/DSCN MKR DOCD: CPT | Performed by: INTERNAL MEDICINE

## 2020-10-12 PROCEDURE — 1090F PRES/ABSN URINE INCON ASSESS: CPT | Performed by: INTERNAL MEDICINE

## 2020-10-12 NOTE — PROGRESS NOTES
Silvio Edwards MD        OFFICE  FOLLOWUP NOTE    Chief complaints: patient is here for management of dizziness, obesity, HTN, dyslipidemia    Subjective: + dizziness, + palpitations    HPI Linsey Rivas is a 77 y. o.year old who  has a past medical history of H/O echocardiogram, History of nuclear stress test, Hyperlipidemia, Hypertension, and Nocturia. and presents for management of dizziness, obesity, HTN, dyslipidemia which are well controlled    She was out side with her friends and had some dizziness and presyncopal episode and she had stress test prior to it which was read lateral wall ischemia,however, on personal evaluation did not agree with ischemia. Current Outpatient Medications   Medication Sig Dispense Refill    losartan (COZAAR) 50 MG tablet TAKE ONE TABLET BY MOUTH EVERY DAY FOR 10 DAYS      Biotin 1000 MCG TABS Take by mouth      [employer]S FIBER GUMMIES PO Take by mouth      Melatonin 5 MG CAPS Take by mouth      Insulin Pen Needle 32G X 6 MM MISC Dispense 1, 50 pack box. 1 each 0    Alcohol Swabs 70 % PADS daily 100 each 0    Vitamin D, Ergocalciferol, 50 MCG (2000 UT) CAPS Take 1,000 Units by mouth Daily      aspirin 81 MG tablet Take 81 mg by mouth daily      amLODIPine (NORVASC) 10 MG tablet Take 10 mg by mouth daily      rosuvastatin (CRESTOR) 5 MG tablet Take 5 mg by mouth daily      vitamin C (ASCORBIC ACID) 500 MG tablet Take 500 mg by mouth daily       No current facility-administered medications for this visit. Allergies: Patient has no known allergies. Past Medical History:   Diagnosis Date    H/O echocardiogram 09/11/2020    EF 55-60%, Mild MR, TR & LVH.     History of nuclear stress test 09/03/2020    EF 69%, ABN, Mild degree of lateral wall ischemia, Exercise induced frequent PVCs and ventricular couplets noted    Hyperlipidemia     Hypertension     Nocturia      Past Surgical History:   Procedure Laterality Date    CARPAL TUNNEL RELEASE Bilateral     FRACTURE SURGERY      Left ankle     HERNIA REPAIR  1998    [facility] (Dr Megan Mckeon)    TUBAL LIGATION       Family History   Problem Relation Age of Onset    Hypertension Mother     Stroke Mother     Parkinsonism Father      Social History     Tobacco Use    Smoking status: Never Smoker    Smokeless tobacco: Never Used   Substance Use Topics    Alcohol use: No      [unfilled]  Review of Systems:   · Constitutional: No Fever or Weight Loss   · Eyes: No Decreased Vision  · ENT: No Headaches, Hearing Loss or Vertigo  · Cardiovascular: No chest pain, dyspnea on exertion,+ palpitations or loss of consciousness  · Respiratory: No cough or wheezing    · Gastrointestinal: No abdominal pain, appetite loss, blood in stools, constipation, diarrhea or heartburn  · Genitourinary: No dysuria, trouble voiding, or hematuria  · Musculoskeletal:  No gait disturbance, weakness or joint complaints  · Integumentary: No rash or pruritis  · Neurological: No TIA or stroke symptoms  · Psychiatric: No anxiety or depression  · Endocrine: No malaise, fatigue or temperature intolerance  · Hematologic/Lymphatic: No bleeding problems, blood clots or swollen lymph nodes  · Allergic/Immunologic: No nasal congestion or hives  All systems negative except as marked. Objective:  /82   Pulse 68   Temp 98.2 °F (36.8 °C) (Temporal)   Wt 225 lb 12.8 oz (102.4 kg)   BMI 44.10 kg/m²   Wt Readings from Last 3 Encounters:   10/12/20 225 lb 12.8 oz (102.4 kg)   08/20/20 221 lb 14.4 oz (100.7 kg)   01/15/20 210 lb 3.2 oz (95.3 kg)     Body mass index is 44.1 kg/m². GENERAL - Alert, oriented, pleasant, in no apparent distress,normal grooming  HEENT - pupils are reactive to light and accomodation, cornea intact, conjunctive normal color, ears are normal in exam,throat exam in normal, teeth, gum and palate are normal, oral mucosa is normal without any notation of pallor or cyanosis  Neck - Supple. No jugular venous distention noted.  No carotid bruits, no apical -carotid delay  Respiratory:  Normal breath sounds, No respiratory distress, No wheezing, No chest tenderness. ,no use of accessory muscles, diaphragm movement is normal  Cardiovascular: (PMI) apex non displaced,no lifts no thrills, no s3,no s4, Normal heart rate, Normal rhythm, No murmurs, No rubs, No gallops. Carotid arteries pulse and amplitude are normal no bruit, no abdominal bruit noted ( normal abdominal aorta ausculation), femoral arteries pulse and amplitude are normal no bruit, pedal pulses are normal  Femoral pulses have normal amplitude, no bruits   Extremities - No cyanosis, clubbing, or significant edema, no varicose veins    Abdomen - No masses, tenderness, or organomegaly, no hepato-splenomegally, no bruits  Musculoskeletal   Mild swelling in the left ankle ( h/o fracture) significant edema, no kyphosis or scoliosis, no deformity in any extremity noted, muscle strength and tone are normal  Skin: no ulcer,no scar,no stasis dermatitis   Neurologic - alert oriented times 3,Cranial nerves II through XII are grossly intact. There were no gross focal neurologic abnormalities. All sensory and motor nerves examined and were normal  Psychiatric: normal mood and affect    No results found for: CKTOTAL, CKMB, CKMBINDEX, TROPONINI  BNP:  No results found for: BNP  PT/INR:  No results found for: PTINR  No results found for: LABA1C  Lab Results   Component Value Date    CHOL 136 06/02/2016    TRIG 110 06/02/2016    HDL 48 06/02/2016    LDLDIRECT 76 06/02/2016     Lab Results   Component Value Date    ALT 12 06/02/2016    AST 16 06/02/2016     TSH:  No results found for: TSH    Impression:  Fernando Galloway is a 77 y. o.year old who  has a past medical history of H/O echocardiogram, History of nuclear stress test, Hyperlipidemia, Hypertension, and Nocturia. and presents with     Plan:  1.  Chest pain: stress test is reviewed with patient , not impressed with ischemia diagnosis, will not recommend [facility] at this time, echo is normal,,   2. Palpitations: 24 hrs holter monitor showed atrial trachycardia at 161 bpm and > 4000 PVCs, will recommend to add BB, however, patient is not willing to start it at this time, she would call us if she would like to start it. 3. Valvular heart disease: mild MR and TR noted  4. Dyslipidemia: controlled, continue crestor  1. HTN: controlled, continue norvasc, Health maintenance: exerise and diet  All labs, medications and tests reviewed, continue all other medications of all above medical condition listed as is.     [unfilled]

## 2021-01-04 ENCOUNTER — TELEPHONE (OUTPATIENT)
Dept: CARDIOLOGY CLINIC | Age: 71
End: 2021-01-04

## 2021-01-04 ENCOUNTER — OFFICE VISIT (OUTPATIENT)
Dept: CARDIOLOGY CLINIC | Age: 71
End: 2021-01-04
Payer: MEDICARE

## 2021-01-04 VITALS
BODY MASS INDEX: 43.7 KG/M2 | WEIGHT: 222.6 LBS | HEIGHT: 60 IN | DIASTOLIC BLOOD PRESSURE: 78 MMHG | SYSTOLIC BLOOD PRESSURE: 128 MMHG

## 2021-01-04 DIAGNOSIS — R55 NEAR SYNCOPE: ICD-10-CM

## 2021-01-04 DIAGNOSIS — I47.1 ATRIAL TACHYCARDIA (HCC): Primary | ICD-10-CM

## 2021-01-04 DIAGNOSIS — R00.2 PALPITATIONS: ICD-10-CM

## 2021-01-04 PROBLEM — I47.19 ATRIAL TACHYCARDIA: Status: ACTIVE | Noted: 2021-01-04

## 2021-01-04 PROCEDURE — 1036F TOBACCO NON-USER: CPT | Performed by: NURSE PRACTITIONER

## 2021-01-04 PROCEDURE — 4040F PNEUMOC VAC/ADMIN/RCVD: CPT | Performed by: NURSE PRACTITIONER

## 2021-01-04 PROCEDURE — G8417 CALC BMI ABV UP PARAM F/U: HCPCS | Performed by: NURSE PRACTITIONER

## 2021-01-04 PROCEDURE — G8427 DOCREV CUR MEDS BY ELIG CLIN: HCPCS | Performed by: NURSE PRACTITIONER

## 2021-01-04 PROCEDURE — 99204 OFFICE O/P NEW MOD 45 MIN: CPT | Performed by: NURSE PRACTITIONER

## 2021-01-04 PROCEDURE — 1123F ACP DISCUSS/DSCN MKR DOCD: CPT | Performed by: NURSE PRACTITIONER

## 2021-01-04 PROCEDURE — G8400 PT W/DXA NO RESULTS DOC: HCPCS | Performed by: NURSE PRACTITIONER

## 2021-01-04 PROCEDURE — 1090F PRES/ABSN URINE INCON ASSESS: CPT | Performed by: NURSE PRACTITIONER

## 2021-01-04 PROCEDURE — 3017F COLORECTAL CA SCREEN DOC REV: CPT | Performed by: NURSE PRACTITIONER

## 2021-01-04 PROCEDURE — G8484 FLU IMMUNIZE NO ADMIN: HCPCS | Performed by: NURSE PRACTITIONER

## 2021-01-04 RX ORDER — LOSARTAN POTASSIUM 25 MG/1
25 TABLET ORAL DAILY
Qty: 60 TABLET | Refills: 2 | Status: SHIPPED | OUTPATIENT
Start: 2021-01-04 | End: 2021-01-28 | Stop reason: SDUPTHER

## 2021-01-04 NOTE — ASSESSMENT & PLAN NOTE
Near syncope associated with elevated heart rate. Will start patient on low-dose Lopressor. Follow-up in 2 weeks to assess palpitations and blood pressure. Decrease losartan to 25 mg daily.  Instructed on at home monitoring of B/P and to notify office if SBP<100

## 2021-01-04 NOTE — ASSESSMENT & PLAN NOTE
Highest rate 160's on Holter monitor. Reports having multiple episodes of near syncope in the last week. Patinet is agreeable to start BB at this time. Will start on Lopressor 25 mg BID.

## 2021-01-04 NOTE — ASSESSMENT & PLAN NOTE
Patient reports increased palpitations over the last week. Previous Holter monitor showed atrial tachycardia. Will start patient on Lopressor 25 mg twice daily.

## 2021-01-04 NOTE — TELEPHONE ENCOUNTER
Last time pt was here Dr offered med for pvc or told her could wait and see, she has had 3-4 episodes in last 3 weeks where room spinning and thought might pass out. Has appt in 18 days, no sooner, ask if wanted to see NP and she will wait for Dr.  Is this ok? If not please call pt.

## 2021-01-04 NOTE — PROGRESS NOTES
[facility] [place]  [address]  Phone: [phone]    Fax [phone]                  Reba Foley MD, Celestina Brown MD, [address] MD, MD Louis Muse Artist, MD Talia Lindquist, APRWILLIAM Ledezma, NICK Orosco, APRWILLIAM Gregorior, APRN    CARDIOLOGY  NOTE      1/4/2021    RE: Sandra Dhillon  (1950)                               TO:  Dr. Jessica Carroll MD  The primary cardiologist is Dr. Danis Lay     CC:   1. Atrial tachycardia (HCC)    2. Palpitations    3. Near syncope        Patient denies all of the following:  Chest Pain  Palpitations  Shortness of Breath  Edema  Dizziness  Syncope      HPI: Thank you for involving me in taking care of your patient Sandra Dhillon, who is a  72y.o. year old female with a history as listed above. Patient presents to the office for follow up on palpitations and dizziness. Holter monitor showed episodes of atrial tachycardia, patient previously did not want to be started on beta-blocker. Patient is here to follow-up on palpitations, reports having 3 episodes in the last week of near syncope.        Vitals:    01/04/21 1318   BP: 128/78       Current Outpatient Medications   Medication Sig Dispense Refill    losartan (COZAAR) 25 MG tablet Take 1 tablet by mouth daily 60 tablet 2    metoprolol tartrate (LOPRESSOR) 25 MG tablet Take 1 tablet by mouth 2 times daily 60 tablet 1    Biotin 1000 MCG TABS Take by mouth      vitamin C (ASCORBIC ACID) 500 MG tablet Take 500 mg by mouth daily      Melatonin 5 MG CAPS Take by mouth      Vitamin D, Ergocalciferol, 50 MCG (2000 UT) CAPS Take 1,000 Units by mouth Daily      aspirin 81 MG tablet Take 81 mg by mouth daily      amLODIPine (NORVASC) 10 MG tablet Take 10 mg by mouth daily      rosuvastatin (CRESTOR) 5 MG tablet Take 5 mg by mouth daily      CVS FIBER GUMMIES PO Take by mouth      Insulin Pen Needle 32G X 6 MM MISC Dispense 1, 50 pack box. 1 each 0    Alcohol Swabs 70 % PADS daily 100 each 0     No current facility-administered medications for this visit. Allergies: Patient has no known allergies. Past Medical History:   Diagnosis Date    H/O echocardiogram 09/11/2020    EF 55-60%, Mild MR, TR & LVH.     History of nuclear stress test 09/03/2020    EF 69%, ABN, Mild degree of lateral wall ischemia, Exercise induced frequent PVCs and ventricular couplets noted    Hyperlipidemia     Hypertension     Nocturia      Past Surgical History:   Procedure Laterality Date    CARPAL TUNNEL RELEASE Bilateral     FRACTURE SURGERY      Left ankle     HERNIA REPAIR  1998    [facility] (Dr Valdez Neither)    TUBAL LIGATION       Family History   Problem Relation Age of Onset    Hypertension Mother     Stroke Mother     Parkinsonism Father      Social History     Tobacco Use    Smoking status: Never Smoker    Smokeless tobacco: Never Used   Substance Use Topics    Alcohol use: No        Review of Systems - History obtained from the patient  General: negative for - fatigue, malaise, night sweats, weight gain  Psychological: negative for - anxiety, depression, sleep disturbances  Ophthalmic: negative for - blurry vision, loss of vision  ENT: negative for - headaches, vertigo, visual changes  Hematological and Lymphatic: negative for - bleeding problems, blood clots, bruising, fatigue or pallor  Endocrine: negative for - malaise/lethargy, palpitations, unexpected weight changes  Respiratory: negative for - cough, orthopnea, shortness of breath or tachypnea  Cardiovascular: negative for - chest pain, dyspnea on exertion, edema or palpitations  Gastrointestinal: no abdominal pain, change in bowel habits, or black or bloody stools  Genito-Urinary: no dysuria, trouble voiding, or hematuria  Musculoskeletal: negative for - gait disturbance, pain, swelling   Neurological: negative for - confusion, dizziness, impaired coordination/balance or numbness/tingling    Objective:      Physical Exam:  /78   Ht 5' (1.524 m)   Wt 222 lb 9.6 oz (101 kg)   BMI 43.47 kg/m²   Wt Readings from Last 3 Encounters:   01/04/21 222 lb 9.6 oz (101 kg)   10/12/20 225 lb 12.8 oz (102.4 kg)   08/20/20 221 lb 14.4 oz (100.7 kg)     Body mass index is 43.47 kg/m². GENERAL - Alert, oriented, pleasant, in no apparent distress. Head unremarkable  Eyes - pupils equal and reactive to light - bilateral conjunctiva are pink: sclera are white   ENT  external ears intact, nose is intact:  tongue is midline pink and moist  Neck - Supple. No jugular venous distention noted. No carotid bruits appreciated. Cardiovascular  Normal S1 and S2:  murmur appreciated No, No gallop. Regular rate- Yes,  rhythm regular-Yes. Extremities - No cyanosis, clubbing, no edema to lower legs. Pulmonary  No respiratory distress. No wheezes or rales. Chest is clear  Pulses: Bilateral radial and pedal pulses normal  Abdomen   Soft no tenderness, non distended   Musculoskeletal  Normal movement of all extremities   Neurologic  alert and oriented: There are no gross focal neurologic abnormalities. Skin-  No rash: No echymosis   Affect- normal mood and pleasant       DATA:  No results found for: CKTOTAL, CKMB, CKMBINDEX, TROPONINI  BNP:  No results found for: BNP  PT/INR:  No results found for: PTINR  No results found for: LABA1C  Lab Results   Component Value Date    CHOL 136 06/02/2016    TRIG 110 06/02/2016    HDL 48 06/02/2016    LDLDIRECT 76 06/02/2016     Lab Results   Component Value Date    ALT 12 06/02/2016    AST 16 06/02/2016     TSH:  No results found for: TSH    Echo:9/11/20  Limited study due to patients body habitus. Left ventricular function is normal, EF is estimated at 55-60%. Mild left ventricular hypertrophy. Grade I diastolic dysfunction. Mild mitral and tricuspid regurgitation is present.    No evidence of pericardial effusion. Stress Test:9/11/2020    Mild ischemia in the lateral wall, increased PVCs during study    The ASCVD Risk score (Melony Butterfield, et al., 2013) failed to calculate for the following reasons:    Cannot find a previous HDL lab    Cannot find a previous total cholesterol lab    Assessment/ Plan:     Atrial tachycardia (HCC)  Highest rate 160's on Holter monitor. Reports having multiple episodes of near syncope in the last week. Patinet is agreeable to start BB at this time. Will start on Lopressor 25 mg BID. Palpitations  Patient reports increased palpitations over the last week. Previous Holter monitor showed atrial tachycardia. Will start patient on Lopressor 25 mg twice daily. Near syncope  Near syncope associated with elevated heart rate. Will start patient on low-dose Lopressor. Follow-up in 2 weeks to assess palpitations and blood pressure. Decrease losartan to 25 mg daily. Instructed on at home monitoring of B/P and to notify office if SBP<100      Patient seen, interviewed and examined. Testing was reviewed. Patient is encouraged to exercise even a brisk walk for 30 minutes at least 3 to 4 times a week. Lifestyle and risk factor modificatons discussed. Various goals are discussed and questions answered. Continue current medications. Appropriate prescriptions are addressed. Questions answered and patient verbalizes understanding. Call for any problems, questions, or concerns. Pt is to follow up in 2 week for Cardiac management    Electronically signed by Ian Lira.  Margart Lennox, APRN - CNP on 1/4/2021 at 1:57 PM

## 2021-01-04 NOTE — LETTER
Neptali Castillo Batch  1950  [id number]    Have you had any Chest Pain that is not new? - No     DO EKG IF: Patient has a Heart Rate above 100 or below 40     CAD (Coronary Artery Disease) patient should have one on file every 6 months        Have you had any Shortness of Breath - No      Have you had any dizziness - Yes  If Yes DO ORTHOSTATIC BP - when do you feel dizzy does the room spin   How long does it last .2  minutes      Sitting wait 5 minutes do supine (laying down) wait 5 minutes then do standing - log each in \"vitals\" area in Epic   Be sure to ask what symptoms they are having if they get dizzy while completing ortho stats such as room spinning, nausea, etc.    Have you had any palpitations that are not new?  - Yes  If Yes DO EKG - Do you feel your heart racing, pounding, fluttering, skipping  How long does it last - .3  seconds     Is the patient on any of the following medications -     Do you have any edema - swelling in No      Do you have a surgery or procedure scheduled in the near future - No

## 2021-01-06 ENCOUNTER — TELEPHONE (OUTPATIENT)
Dept: CARDIOLOGY CLINIC | Age: 71
End: 2021-01-06

## 2021-01-06 NOTE — TELEPHONE ENCOUNTER
Patient called in and wanted to talk with Ale Calderon in regards to medications that was prescribed to her yesterday at her appointment. She stated that while at appointment here another provider had sent her a message that they sent a RX for Cipro 500mg 2X daily to pharmacy for her UTI. She said she picked it up and seen that if she was on anything for heart rate she should not take. Patient wants to know if it is ok to hold off on starting the Lopressor until after the RX for her UTI is complete or if it is ok to take both. Patient can be reached at [phone].

## 2021-01-21 ENCOUNTER — NURSE ONLY (OUTPATIENT)
Dept: CARDIOLOGY CLINIC | Age: 71
End: 2021-01-21
Payer: MEDICARE

## 2021-01-21 VITALS
SYSTOLIC BLOOD PRESSURE: 128 MMHG | DIASTOLIC BLOOD PRESSURE: 88 MMHG | BODY MASS INDEX: 43.94 KG/M2 | WEIGHT: 223.8 LBS | HEIGHT: 60 IN | RESPIRATION RATE: 18 BRPM | HEART RATE: 62 BPM

## 2021-01-21 DIAGNOSIS — I10 ESSENTIAL HYPERTENSION: Primary | ICD-10-CM

## 2021-01-21 PROCEDURE — 99211 OFF/OP EST MAY X REQ PHY/QHP: CPT | Performed by: NURSE PRACTITIONER

## 2021-01-21 NOTE — PROGRESS NOTES
[address], [address]  Phone: [phone]    Fax [phone]                  Venancio Terry MD, Lissette Bravo MD, Stefanie Whitaker MD, MD Alicia Sanchez MD Ceil Dan, MD Alpheus Bayard, NICK French, NICK Valentine, NICK Fleming    BP and Weight Check Visit    Pt is here for a 2 week BP and weight check. Patient previously had episodes of palpitations and near syncopal episodes. Patient has a history of atrial tachycardia but did not want started on medication at that time. On most recent visit patient was started on Lopressor 25 mg twice daily and losartan was decreased to 25 mg daily. Patient denies any palpitations. Vitals:    01/21/21 1124   BP: 128/88   Pulse: 62   Resp: 18       Wt Readings from Last 3 Encounters:   01/21/21 223 lb 12.8 oz (101.5 kg)   01/04/21 222 lb 9.6 oz (101 kg)   10/12/20 225 lb 12.8 oz (102.4 kg)        Plan for pt is to continue with current medications. Follow up in 6 months     Pt is to report any dizziness or syncope to the office        Electronically signed by Josh Monte.  NICK Mitchell - CNP on 1/21/2021 at 11:42 AM

## 2021-01-28 RX ORDER — LOSARTAN POTASSIUM 25 MG/1
25 TABLET ORAL DAILY
Qty: 180 TABLET | Refills: 3 | Status: SHIPPED | OUTPATIENT
Start: 2021-01-28

## 2021-07-22 ENCOUNTER — OFFICE VISIT (OUTPATIENT)
Dept: CARDIOLOGY CLINIC | Age: 71
End: 2021-07-22
Payer: MEDICARE

## 2021-07-22 VITALS
BODY MASS INDEX: 44.37 KG/M2 | HEART RATE: 64 BPM | HEIGHT: 60 IN | WEIGHT: 226 LBS | SYSTOLIC BLOOD PRESSURE: 122 MMHG | DIASTOLIC BLOOD PRESSURE: 66 MMHG

## 2021-07-22 DIAGNOSIS — R00.2 PALPITATIONS: Primary | ICD-10-CM

## 2021-07-22 PROCEDURE — 99214 OFFICE O/P EST MOD 30 MIN: CPT | Performed by: INTERNAL MEDICINE

## 2021-07-22 PROCEDURE — G8428 CUR MEDS NOT DOCUMENT: HCPCS | Performed by: INTERNAL MEDICINE

## 2021-07-22 PROCEDURE — G8417 CALC BMI ABV UP PARAM F/U: HCPCS | Performed by: INTERNAL MEDICINE

## 2021-07-22 PROCEDURE — 1036F TOBACCO NON-USER: CPT | Performed by: INTERNAL MEDICINE

## 2021-07-22 PROCEDURE — 1123F ACP DISCUSS/DSCN MKR DOCD: CPT | Performed by: INTERNAL MEDICINE

## 2021-07-22 PROCEDURE — 93000 ELECTROCARDIOGRAM COMPLETE: CPT | Performed by: INTERNAL MEDICINE

## 2021-07-22 PROCEDURE — 4040F PNEUMOC VAC/ADMIN/RCVD: CPT | Performed by: INTERNAL MEDICINE

## 2021-07-22 PROCEDURE — G8400 PT W/DXA NO RESULTS DOC: HCPCS | Performed by: INTERNAL MEDICINE

## 2021-07-22 PROCEDURE — 1090F PRES/ABSN URINE INCON ASSESS: CPT | Performed by: INTERNAL MEDICINE

## 2021-07-22 PROCEDURE — 3017F COLORECTAL CA SCREEN DOC REV: CPT | Performed by: INTERNAL MEDICINE

## 2021-07-22 NOTE — PROGRESS NOTES
Michael Tabor MD        OFFICE  FOLLOWUP NOTE    Chief complaints: patient is here for management of dizziness, obesity, HTN, dyslipidemia    Subjective: patient feels better, no chest pain, no shortness of breath, + dizziness, + palpitations    HPI Bruna is a 72 y. o.year old who  has a past medical history of H/O echocardiogram, History of nuclear stress test, Hyperlipidemia, Hypertension, and Nocturia. and presents for management of dizziness, obesity, HTN, dyslipidemia which are well controlled    Patient was relucatant to start lopressor, however, she later started, feels better, had 1 or 2 episode of palpitation and dizziness. Current Outpatient Medications   Medication Sig Dispense Refill    sertraline (ZOLOFT) 50 MG tablet Take 50 mg by mouth daily      metoprolol tartrate (LOPRESSOR) 25 MG tablet Take 1 tablet by mouth 2 times daily 180 tablet 3    losartan (COZAAR) 25 MG tablet Take 1 tablet by mouth daily 180 tablet 3    Biotin 1000 MCG TABS Take by mouth      vitamin C (ASCORBIC ACID) 500 MG tablet Take 500 mg by mouth daily      Melatonin 5 MG CAPS Take by mouth nightly       Insulin Pen Needle 32G X 6 MM MISC Dispense 1, 50 pack box. 1 each 0    Alcohol Swabs 70 % PADS daily 100 each 0    Vitamin D, Ergocalciferol, 50 MCG (2000 UT) CAPS Take 1,000 Units by mouth Daily      aspirin 81 MG tablet Take 81 mg by mouth daily      amLODIPine (NORVASC) 10 MG tablet Take 10 mg by mouth daily      rosuvastatin (CRESTOR) 5 MG tablet Take 5 mg by mouth daily      [employer]S FIBER GUMMIES PO Take by mouth (Patient not taking: Reported on 7/22/2021)       No current facility-administered medications for this visit. Allergies: Patient has no known allergies. Past Medical History:   Diagnosis Date    H/O echocardiogram 09/11/2020    EF 55-60%, Mild MR, TR & LVH.     History of nuclear stress test 09/03/2020    EF 69%, ABN, Mild degree of lateral wall ischemia, Exercise induced frequent PVCs and ventricular couplets noted    Hyperlipidemia     Hypertension     Nocturia      Past Surgical History:   Procedure Laterality Date    CARPAL TUNNEL RELEASE Bilateral     FRACTURE SURGERY      Left ankle     HERNIA REPAIR  1998    [facility] (Dr Cornelius Winters)    TUBAL LIGATION       Family History   Problem Relation Age of Onset    Hypertension Mother     Stroke Mother     Parkinsonism Father      Social History     Tobacco Use    Smoking status: Never Smoker    Smokeless tobacco: Never Used   Substance Use Topics    Alcohol use: No     Comment: caffeine       [unfilled]  Review of Systems:   · Constitutional: No Fever or Weight Loss   · Eyes: No Decreased Vision  · ENT: No Headaches, Hearing Loss or Vertigo  · Cardiovascular: No chest pain, dyspnea on exertion, palpitations or loss of consciousness  · Respiratory: No cough or wheezing    · Gastrointestinal: No abdominal pain, appetite loss, blood in stools, constipation, diarrhea or heartburn  · Genitourinary: No dysuria, trouble voiding, or hematuria  · Musculoskeletal:  No gait disturbance, weakness or joint complaints  · Integumentary: No rash or pruritis  · Neurological: No TIA or stroke symptoms  · Psychiatric: + anxiety or depression  · Endocrine: No malaise, fatigue or temperature intolerance  · Hematologic/Lymphatic: No bleeding problems, blood clots or swollen lymph nodes  · Allergic/Immunologic: No nasal congestion or hives  All systems negative except as marked. Objective:  /66 (Position: Standing)   Pulse 64   Ht 5' (1.524 m)   Wt 226 lb (102.5 kg)   BMI 44.14 kg/m²   Wt Readings from Last 3 Encounters:   07/22/21 226 lb (102.5 kg)   01/21/21 223 lb 12.8 oz (101.5 kg)   01/04/21 222 lb 9.6 oz (101 kg)     Body mass index is 44.14 kg/m². GENERAL - Alert, oriented, pleasant, in no apparent distress,normal grooming  HEENT  pupils are intact, cornea intact, conjunctive normal color, ears are normal in exam,  Neck - Supple. last time her 24 hrs holter monitor showed atrial trachycardia at 161 bpm and > 4000 PVCs, patient was not willing to start it at that time, she finally agreed to start it, feels better now  4. Valvular heart disease: mild MR and TR noted  5. Dyslipidemia: controlled, continue crestor  1. HTN: controlled, continue norvasc, Health maintenance: exerise and diet  1. Health maintenance: exerise and diet  All labs, medications and tests reviewed, continue all other medications of all above medical condition listed as is.     @[facility]@

## 2021-07-22 NOTE — PATIENT INSTRUCTIONS
Please be informed that if you contact our office outside of normal business hours the physician on call cannot help with any scheduling or rescheduling issues, procedure instruction questions or any type of medication issue. We advise you for any urgent/emergency that you go to the nearest emergency room! PLEASE CALL OUR OFFICE DURING NORMAL BUSINESS HOURS    Monday - Friday   8 am to 5 pm    [place]: [address]: [phone]    [place]:  [phone]  **It is YOUR responsibilty to bring medication bottles and/or updated medication list to [address] [place] [address].  This will allow us to better serve you and all your healthcare needs**

## 2021-07-22 NOTE — LETTER
Belle Bhardwaj  1950  [id number]    Have you had any Chest Pain that is not new? - No  If Yes DO EKG - How does it feel -    How long does the pain last -      How long have you been having the pain -    Did you take a    And did it relieve the pain -      DO EKG IF: Patient has a Heart Rate above 100 or below 40     CAD (Coronary Artery Disease) patient should have one on file every 6 months        Have you had any Shortness of Breath - No  If Yes - When     Have you had any dizziness - Yes  If Yes DO ORTHOSTATIC BP - when do you feel dizzy does the room spin   How long does it last .  seconds      Sitting wait 5 minutes do supine (laying down) wait 5 minutes then do standing - log each in \"vitals\" area in Epic   Be sure to ask what symptoms they are having if they get dizzy while completing ortho stats such as: room spinning, nausea, etc.    Have you had any palpitations that are not new? - Yes  If Yes DO EKG - Do you feel your heart racing  How long does it last - .  seconds     Is the patient on any of the following medications -   If Yes DO EKG - Needs done every 6 months    Do you have any edema - swelling in legs    If Yes - CHECK TO SEE IF THE EDEMA IS PITTING  How long have they been having edema - Weeks  If Yes - Have they worn compression stockings Yes  If they have worn compression stockings      Vein \"LEG PROBLEM Questionnaire\"  1. Do you have prominent leg veins? Yes   2. Do you have any skin discoloration? No  3. Do you have any healed or active sores? No  4. Do you have swelling of the legs? Yes  5. Do you have a family history of varicose veins? No  6. Does your profession involve pro-longed        standing or heavy lifting? Yes  7. Have you been fighting overweight problems? Yes  8. Do you have restless legs? No  9. Do you have any night time cramps? Yes  10.  Do you have any of the following in your legs: Tiredness     When did you have your last labs drawn 2021  Where did you have them done   What doctor ordered     If we do not have these labs you are retrieve these labs for these providers! Do you have a surgery or procedure scheduled in the near future - No  If Yes- DO EKG  If Yes - Who is the surgery with?    Phone number of physician   Fax number of physician   Type of surgery   GIVE THIS INFORMATION TO NARA ELDER     Ask patient if they want to sign up for MyChart if they are not already signed up     Check to see if we have an E-MAIL on file for the patient     Check medication list thoroughly!!! AND RECONCILE OUTSIDE MEDICATIONS  If dose has changed change the entire order not just the [place] [facility] check out add to every patient's \"wrap up\" the following dot phrase AFTERHOURSEDUCATION and ensure we explain this to our patients

## 2021-07-28 ENCOUNTER — PROCEDURE VISIT (OUTPATIENT)
Dept: CARDIOLOGY CLINIC | Age: 71
End: 2021-07-28
Payer: MEDICARE

## 2021-07-28 DIAGNOSIS — R55 NEAR SYNCOPE: ICD-10-CM

## 2021-07-28 DIAGNOSIS — R00.2 PALPITATIONS: ICD-10-CM

## 2021-07-28 PROCEDURE — 93015 CV STRESS TEST SUPVJ I&R: CPT | Performed by: INTERNAL MEDICINE

## 2021-08-10 ENCOUNTER — HOSPITAL ENCOUNTER (OUTPATIENT)
Dept: ULTRASOUND IMAGING | Age: 71
Discharge: HOME | End: 2021-08-10
Payer: MEDICARE

## 2021-08-10 DIAGNOSIS — D68.9 BLOOD CLOTTING DISORDER (HCC): ICD-10-CM

## 2021-08-10 DIAGNOSIS — R60.0 LEG EDEMA: ICD-10-CM

## 2021-08-10 PROCEDURE — 93971 EXTREMITY STUDY: CPT

## 2021-08-10 PROCEDURE — 76882 US LMTD JT/FCL EVL NVASC XTR: CPT

## 2021-11-04 ENCOUNTER — OFFICE VISIT (OUTPATIENT)
Dept: CARDIOLOGY CLINIC | Age: 71
End: 2021-11-04
Payer: MEDICARE

## 2021-11-04 VITALS
HEART RATE: 64 BPM | DIASTOLIC BLOOD PRESSURE: 80 MMHG | BODY MASS INDEX: 45.35 KG/M2 | WEIGHT: 231 LBS | SYSTOLIC BLOOD PRESSURE: 118 MMHG | HEIGHT: 60 IN

## 2021-11-04 DIAGNOSIS — R00.2 PALPITATIONS: Primary | ICD-10-CM

## 2021-11-04 PROCEDURE — 99214 OFFICE O/P EST MOD 30 MIN: CPT | Performed by: INTERNAL MEDICINE

## 2021-11-04 PROCEDURE — 1036F TOBACCO NON-USER: CPT | Performed by: INTERNAL MEDICINE

## 2021-11-04 PROCEDURE — G8400 PT W/DXA NO RESULTS DOC: HCPCS | Performed by: INTERNAL MEDICINE

## 2021-11-04 PROCEDURE — 1123F ACP DISCUSS/DSCN MKR DOCD: CPT | Performed by: INTERNAL MEDICINE

## 2021-11-04 PROCEDURE — G8417 CALC BMI ABV UP PARAM F/U: HCPCS | Performed by: INTERNAL MEDICINE

## 2021-11-04 PROCEDURE — G8484 FLU IMMUNIZE NO ADMIN: HCPCS | Performed by: INTERNAL MEDICINE

## 2021-11-04 PROCEDURE — 1090F PRES/ABSN URINE INCON ASSESS: CPT | Performed by: INTERNAL MEDICINE

## 2021-11-04 PROCEDURE — G8427 DOCREV CUR MEDS BY ELIG CLIN: HCPCS | Performed by: INTERNAL MEDICINE

## 2021-11-04 PROCEDURE — 4040F PNEUMOC VAC/ADMIN/RCVD: CPT | Performed by: INTERNAL MEDICINE

## 2021-11-04 PROCEDURE — 3017F COLORECTAL CA SCREEN DOC REV: CPT | Performed by: INTERNAL MEDICINE

## 2021-11-04 RX ORDER — TRIAMTERENE AND HYDROCHLOROTHIAZIDE 37.5; 25 MG/1; MG/1
CAPSULE ORAL
COMMUNITY
Start: 2021-08-10

## 2021-11-04 RX ORDER — PHENTERMINE HYDROCHLORIDE 37.5 MG/1
37.5 TABLET ORAL
Qty: 30 TABLET | Refills: 0 | Status: SHIPPED | OUTPATIENT
Start: 2021-11-04 | End: 2021-11-05 | Stop reason: SDUPTHER

## 2021-11-04 NOTE — LETTER
Jonathan Sampson  1950  [id number]    Have you had any Chest Pain that is not new? - No  If Yes DO EKG - How does it feel -    How long does the pain last -      How long have you been having the pain -    Did you take a    And did it relieve the pain -      DO EKG IF: Patient has a Heart Rate above 100 or below 40     CAD (Coronary Artery Disease) patient should have one on file every 6 months        Have you had any Shortness of Breath - No  If Yes - When     Have you had any dizziness - No  If Yes DO ORTHOSTATIC BP - when do you feel dizzy    How long does it last .        Sitting wait 5 minutes do supine (laying down) wait 5 minutes then do standing - log each in \"vitals\" area in Epic   Be sure to ask what symptoms they are having if they get dizzy while completing ortho stats such as: room spinning, nausea, etc.    Have you had any palpitations that are not new? - No  If Yes DO EKG - Do you feel your heart   How long does it last - . Is the patient on any of the following medications - No  If Yes DO EKG - Needs done every 6 months    Do you have any edema - swelling in ankles, broke her left ankle in 2008  If Yes - CHECK TO SEE IF THE EDEMA IS PITTING  How long have they been having edema - Years  If Yes - Have they worn compression stockings Yes  If they have worn compression stockings  Day's, takes them off at night     Vein \"LEG PROBLEM Questionnaire\"  1. Do you have prominent leg veins? No   2. Do you have any skin discoloration? No  3. Do you have any healed or active sores? No  4. Do you have swelling of the legs? Yes  5. Do you have a family history of varicose veins? No  6. Does your profession involve pro-longed        standing or heavy lifting? Yes, prolonged standing   7. Have you been fighting overweight problems? Yes  8. Do you have restless legs? No  9. Do you have any night time cramps?   Yes, more in the left leg  10. Do you have any of the following in your legs:        Aching     When did you have your last labs drawn   Where did you have them done   What doctor ordered     If we do not have these labs you are retrieve these labs for these providers! Do you have a surgery or procedure scheduled in the near future - No  If Yes- DO EKG  If Yes - Who is the surgery with?    Phone number of physician   Fax number of physician   Type of surgery   GIVE THIS INFORMATION TO NARA ELDER     Ask patient if they [facility] to [facility] if they are not already signed [facility]     Check to see if we have an E-MAIL on file for the patient     Check medication list thoroughly!!! AND RECONCILE OUTSIDE MEDICATIONS  If dose has changed change the entire order not just the [place] [facility] check out add to every patient's \"wrap up\" the following dot phrase AFTERHOURSEDUCATION and ensure we explain this to our patients

## 2021-11-04 NOTE — PROGRESS NOTES
Marleny Alexander MD        OFFICE  FOLLOWUP NOTE    Chief complaints: patient is here for management of dizziness, obesity, HTN, dyslipidemia  Subjective: patient feels better, no chest pain, no shortness of breath, no dizziness, no palpitations    HPI Bruna is a 80 y. o.year old who  has a past medical history of H/O echocardiogram, History of nuclear stress test, Hx of exercise stress test, Hyperlipidemia, Hypertension, and Nocturia. and presents for management of dizziness, obesity, HTN, dyslipidemia which are well controlled    She gained 8lb weight, she is on zoloft and thinks its making him gain weight/    Current Outpatient Medications   Medication Sig Dispense Refill    sertraline (ZOLOFT) 50 MG tablet Take 50 mg by mouth daily      metoprolol tartrate (LOPRESSOR) 25 MG tablet Take 1 tablet by mouth 2 times daily 180 tablet 3    losartan (COZAAR) 25 MG tablet Take 1 tablet by mouth daily 180 tablet 3    Biotin 1000 MCG TABS Take by mouth      vitamin C (ASCORBIC ACID) 500 MG tablet Take 500 mg by mouth daily      Melatonin 5 MG CAPS Take by mouth nightly       Vitamin D, Ergocalciferol, 50 MCG (2000 UT) CAPS Take 1,000 Units by mouth Daily      aspirin 81 MG tablet Take 81 mg by mouth daily      amLODIPine (NORVASC) 10 MG tablet Take 10 mg by mouth daily      rosuvastatin (CRESTOR) 5 MG tablet Take 5 mg by mouth daily      triamterene-hydroCHLOROthiazide (DYAZIDE) 37.5-25 MG per capsule       [employer] FIBER GUMMIES PO Take by mouth (Patient not taking: Reported on 7/22/2021)      Insulin Pen Needle 32G X 6 MM MISC Dispense 1, 50 pack box. (Patient not taking: Reported on 11/4/2021) 1 each 0    Alcohol Swabs 70 % PADS daily (Patient not taking: Reported on 11/4/2021) 100 each 0     No current facility-administered medications for this visit. Allergies: Patient has no known allergies.   Past Medical History:   Diagnosis Date    H/O echocardiogram 09/11/2020    EF 55-60%, Mild MR, TR & LVH.  History of nuclear stress test 09/03/2020    EF 69%, ABN, Mild degree of lateral wall ischemia, Exercise induced frequent PVCs and ventricular couplets noted    Hx of exercise stress test 07/28/2021    Treadmill    Hyperlipidemia     Hypertension     Nocturia      Past Surgical History:   Procedure Laterality Date    CARPAL TUNNEL RELEASE Bilateral     FRACTURE SURGERY      Left ankle     HERNIA REPAIR  1998    [facility] (Dr Alfred Stanford)    TUBAL LIGATION       Family History   Problem Relation Age of Onset    Hypertension Mother     Stroke Mother     Parkinsonism Father      Social History     Tobacco Use    Smoking status: Never Smoker    Smokeless tobacco: Never Used   Substance Use Topics    Alcohol use: No     Comment: caffeine       [unfilled]  Review of Systems:   · Constitutional: No Fever or Weight Loss   · Eyes: No Decreased Vision  · ENT: No Headaches, Hearing Loss or Vertigo  · Cardiovascular: No chest pain, dyspnea on exertion, palpitations or loss of consciousness  · Respiratory: No cough or wheezing    · Gastrointestinal: No abdominal pain, appetite loss, blood in stools, constipation, diarrhea or heartburn  · Genitourinary: No dysuria, trouble voiding, or hematuria  · Musculoskeletal:  No gait disturbance, weakness or joint complaints  · Integumentary: No rash or pruritis  · Neurological: No TIA or stroke symptoms  · Psychiatric: No anxiety or depression  · Endocrine: No malaise, fatigue or temperature intolerance  · Hematologic/Lymphatic: No bleeding problems, blood clots or swollen lymph nodes  · Allergic/Immunologic: No nasal congestion or hives  All systems negative except as marked. Objective:  /80   Pulse 64   Ht 5' (1.524 m)   Wt 231 lb (104.8 kg)   BMI 45.11 kg/m²   Wt Readings from Last 3 Encounters:   11/04/21 231 lb (104.8 kg)   07/22/21 226 lb (102.5 kg)   01/21/21 223 lb 12.8 oz (101.5 kg)     Body mass index is 45.11 kg/m².   GENERAL - Alert, oriented, pleasant, in no apparent distress,normal grooming  HEENT  pupils are intact, cornea intact, conjunctive normal color, ears are normal in exam,  Neck - Supple. No jugular venous distention noted. No carotid bruits, no apical -carotid delay  Respiratory:  Normal breath sounds, No respiratory distress, No wheezing, No chest tenderness. ,no use of accessory muscles, diaphragm movement is normal  Cardiovascular: (PMI) apex non displaced,no lifts no thrills, no s3,no s4, Normal heart rate, Normal rhythm, No murmurs, No rubs, No gallops. Carotid arteries pulse and amplitude are normal no bruit, no abdominal bruit noted ( normal abdominal aorta ausculation),   Extremities - No cyanosis, clubbing, or significant edema, no varicose veins    Abdomen  No masses, tenderness, or organomegaly, no hepato-splenomegally, no bruits  Musculoskeletal  No significant edema, no kyphosis or scoliosis, no deformity in any extremity noted, muscle strength and tone are normal  Skin: no ulcer,no scar,no stasis dermatitis   Neurologic  alert oriented times 3,Cranial nerves II through XII are grossly intact. There were no gross focal neurologic abnormalities. Psychiatric: normal mood and affect    No results found for: CKTOTAL, CKMB, CKMBINDEX, TROPONINI  BNP:  No results found for: BNP  PT/INR:  No results found for: PTINR  No results found for: LABA1C  Lab Results   Component Value Date    CHOL 136 06/02/2016    TRIG 110 06/02/2016    HDL 48 06/02/2016    LDLDIRECT 76 06/02/2016     Lab Results   Component Value Date    ALT 12 06/02/2016    AST 16 06/02/2016     TSH:  No results found for: TSH    Impression:  Tapan Villagran is a 80 y. o.year old who  has a past medical history of H/O echocardiogram, History of nuclear stress test, Hx of exercise stress test, Hyperlipidemia, Hypertension, and Nocturia. and presents with     Plan:  1.  Chest pain: stress test was reviewed with patient , was not impressed with ischemia diagnosis,  not recommended [facility] last  time, echo was normal,, will get treadmill to check for her dyspnea  2. Obesity: recommend to lose weight,add adipex  3. Anger and mood disorder: started on zoloft by PMD, STABLE for now  4. Palpitations: resolved  on lopressor, last time her 24 hrs holter monitor in 2020 showed atrial trachycardia at 161 bpm and > 4000 PVCs, patient was not willing to start it at that time, she finally agreed to start it, feels better now  5. Valvular heart disease: mild MR and TR noted  6. Dyslipidemia: controlled, continue crestor  1. HTN: controlled, continue norvasc, Health maintenance: exerise and diet  Health maintenance: exerise and dietAll labs, medications and tests reviewed, continue all other medications of all above medical condition listed as is.     [unfilled]

## 2021-11-05 DIAGNOSIS — R00.2 PALPITATIONS: ICD-10-CM

## 2021-11-05 RX ORDER — PHENTERMINE HYDROCHLORIDE 37.5 MG/1
37.5 TABLET ORAL
Qty: 30 TABLET | Refills: 0 | Status: SHIPPED | OUTPATIENT
Start: 2021-11-05 | End: 2021-12-03 | Stop reason: SDUPTHER

## 2021-12-03 ENCOUNTER — OFFICE VISIT (OUTPATIENT)
Dept: CARDIOLOGY CLINIC | Age: 71
End: 2021-12-03
Payer: MEDICARE

## 2021-12-03 VITALS
WEIGHT: 220.6 LBS | HEART RATE: 68 BPM | BODY MASS INDEX: 43.31 KG/M2 | DIASTOLIC BLOOD PRESSURE: 82 MMHG | HEIGHT: 60 IN | OXYGEN SATURATION: 97 % | SYSTOLIC BLOOD PRESSURE: 130 MMHG

## 2021-12-03 DIAGNOSIS — R00.2 PALPITATIONS: ICD-10-CM

## 2021-12-03 PROCEDURE — 1090F PRES/ABSN URINE INCON ASSESS: CPT | Performed by: INTERNAL MEDICINE

## 2021-12-03 PROCEDURE — 1036F TOBACCO NON-USER: CPT | Performed by: INTERNAL MEDICINE

## 2021-12-03 PROCEDURE — 3017F COLORECTAL CA SCREEN DOC REV: CPT | Performed by: INTERNAL MEDICINE

## 2021-12-03 PROCEDURE — 4040F PNEUMOC VAC/ADMIN/RCVD: CPT | Performed by: INTERNAL MEDICINE

## 2021-12-03 PROCEDURE — 1123F ACP DISCUSS/DSCN MKR DOCD: CPT | Performed by: INTERNAL MEDICINE

## 2021-12-03 PROCEDURE — G8484 FLU IMMUNIZE NO ADMIN: HCPCS | Performed by: INTERNAL MEDICINE

## 2021-12-03 PROCEDURE — 99214 OFFICE O/P EST MOD 30 MIN: CPT | Performed by: INTERNAL MEDICINE

## 2021-12-03 PROCEDURE — G8427 DOCREV CUR MEDS BY ELIG CLIN: HCPCS | Performed by: INTERNAL MEDICINE

## 2021-12-03 PROCEDURE — G8400 PT W/DXA NO RESULTS DOC: HCPCS | Performed by: INTERNAL MEDICINE

## 2021-12-03 PROCEDURE — G8417 CALC BMI ABV UP PARAM F/U: HCPCS | Performed by: INTERNAL MEDICINE

## 2021-12-03 RX ORDER — PHENTERMINE HYDROCHLORIDE 37.5 MG/1
37.5 TABLET ORAL
Qty: 30 TABLET | Refills: 0 | Status: SHIPPED | OUTPATIENT
Start: 2021-12-03 | End: 2022-01-02

## 2021-12-03 NOTE — LETTER
Stephie Tabor  1950  [id number]    Have you had any Chest Pain that is not new? - No       DO EKG IF: Patient has a Heart Rate above 100 or below 40     CAD (Coronary Artery Disease) patient should have one on file every 6 months        Have you had any Shortness of Breath - No     Sitting wait 5 minutes do supine (laying down) wait 5 minutes then do standing - log each in \"vitals\" area in Epic   Be sure to ask what symptoms they are having if they get dizzy while completing ortho stats such as: room spinning, nausea, etc.    Have you had any palpitations that are not new? - No    Is the patient on any of the following medications - N/A  If Yes DO EKG - Needs done every 6 months    Do you have any edema - swelling in No        When did you have your last labs drawn 3 weeks ago  Where did you have them done [facility]  What doctor ordered Dr Mauro Daniels    If we do not have these labs you are retrieve these labs for these providers!     Do you have a surgery or procedure scheduled in the near future - No       Ask patient if they want to [facility] if they are not already signed up     Check to see if we have an E-MAIL on file for the patient     Check medication list thoroughly!!! AND RECONCILE OUTSIDE MEDICATIONS  If dose has changed change the entire order not just the MG  BE SURE TO ASK PATIENT IF THEY NEED MEDICATION REFILLS     At check out add to every patient's \"wrap up\" the following dot phrase AFTERHOURSEDUCATION and ensure we explain this to our patients

## 2021-12-03 NOTE — PROGRESS NOTES
Marino Loomis MD        OFFICE  FOLLOWUP NOTE    Chief complaints: patient is here for management of dizziness, obesity, HTN, dyslipidemia    2nd adipex refill: only dry mouth as side effect    Subjective: patient feels better, no chest pain, no shortness of breath, no dizziness, no palpitations    HPI Bruna is a 80 y. o.year old who  has a past medical history of H/O echocardiogram, History of nuclear stress test, Hx of exercise stress test, Hyperlipidemia, Hypertension, and Nocturia. and presents for management of dizziness, obesity, HTN, dyslipidemiadizziness, obesity, HTN, dyslipidemia which are well controlled      Current Outpatient Medications   Medication Sig Dispense Refill    phentermine (ADIPEX-P) 37.5 MG tablet Take 1 tablet by mouth every morning (before breakfast) for 30 days. 30 tablet 0    triamterene-hydroCHLOROthiazide (DYAZIDE) 37.5-25 MG per capsule       sertraline (ZOLOFT) 50 MG tablet Take 50 mg by mouth daily      metoprolol tartrate (LOPRESSOR) 25 MG tablet Take 1 tablet by mouth 2 times daily 180 tablet 3    losartan (COZAAR) 25 MG tablet Take 1 tablet by mouth daily 180 tablet 3    Biotin 1000 MCG TABS Take by mouth      aspirin 81 MG tablet Take 81 mg by mouth daily      amLODIPine (NORVASC) 10 MG tablet Take 10 mg by mouth daily      rosuvastatin (CRESTOR) 5 MG tablet Take 5 mg by mouth daily      [employer] FIBER GUMMIES PO Take by mouth (Patient not taking: Reported on 7/22/2021)      vitamin C (ASCORBIC ACID) 500 MG tablet Take 500 mg by mouth daily (Patient not taking: Reported on 12/3/2021)      Melatonin 5 MG CAPS Take by mouth nightly  (Patient not taking: Reported on 12/3/2021)      Insulin Pen Needle 32G X 6 MM MISC Dispense 1, 50 pack box.  (Patient not taking: Reported on 11/4/2021) 1 each 0    Alcohol Swabs 70 % PADS daily (Patient not taking: Reported on 11/4/2021) 100 each 0    Vitamin D, Ergocalciferol, 50 MCG (2000 UT) CAPS Take 1,000 Units by Left Upper Arm, Position: Sitting, Cuff Size: Medium Adult)   Pulse 68   Ht 5' (1.524 m)   Wt 220 lb 9.6 oz (100.1 kg)   SpO2 97%   BMI 43.08 kg/m²   Wt Readings from Last 3 Encounters:   12/03/21 220 lb 9.6 oz (100.1 kg)   11/04/21 231 lb (104.8 kg)   07/22/21 226 lb (102.5 kg)     Body mass index is 43.08 kg/m². GENERAL - Alert, oriented, pleasant, in no apparent distress,normal grooming  HEENT  pupils are intact, cornea intact, conjunctive normal color, ears are normal in exam,  Neck - Supple. No jugular venous distention noted. No carotid bruits, no apical -carotid delay  Respiratory:  Normal breath sounds, No respiratory distress, No wheezing, No chest tenderness. ,no use of accessory muscles, diaphragm movement is normal  Cardiovascular: (PMI) apex non displaced,no lifts no thrills, no s3,no s4, Normal heart rate, Normal rhythm, No murmurs, No rubs, No gallops. Carotid arteries pulse and amplitude are normal no bruit, no abdominal bruit noted ( normal abdominal aorta ausculation),   Extremities - No cyanosis, clubbing, or significant edema, no varicose veins    Abdomen  No masses, tenderness, or organomegaly, no hepato-splenomegally, no bruits  Musculoskeletal  No significant edema, no kyphosis or scoliosis, no deformity in any extremity noted, muscle strength and tone are normal  Skin: no ulcer,no scar,no stasis dermatitis   Neurologic  alert oriented times 3,Cranial nerves II through XII are grossly intact. There were no gross focal neurologic abnormalities.    Psychiatric: normal mood and affect    No results found for: CKTOTAL, CKMB, CKMBINDEX, TROPONINI  BNP:  No results found for: BNP  PT/INR:  No results found for: PTINR  No results found for: LABA1C  Lab Results   Component Value Date    CHOL 136 06/02/2016    TRIG 110 06/02/2016    HDL 48 06/02/2016    LDLDIRECT 76 06/02/2016     Lab Results   Component Value Date    ALT 12 06/02/2016    AST 16 06/02/2016     TSH:  No results found for: TSH    Impression:  Stewart Elizabeth is a 80 y. o.year old who  has a past medical history of H/O echocardiogram, History of nuclear stress test, Hx of exercise stress test, Hyperlipidemia, Hypertension, and Nocturia. and presents with     Plan:  1. Chest pain: stress test was reviewed with patient , was not impressed with ischemia diagnosis,  not recommended [facility] last  time, echo was normal,, will get treadmill to check for her dyspnea  2. Obesity: recommend to lose weight refill adipex, she lost 9  lbs  3. Anger and mood disorder: better since started on zoloft by PMD, STABLE for now  4. Palpitations: resolved  on lopressor, last time her 24 hrs holter monitor in 2020 showed atrial trachycardia at 161 bpm and > 4000 PVCs, patient was not willing to start it at that time, she finally agreed to start it, feels better now  5. Valvular heart disease: mild MR and TR noted  6. Dyslipidemia: controlled, continue crestor  1. HTN: controlled, continue norvasc, Health maintenance: exerise and diet  1. All labs, medications and tests reviewed, continue all other medications of all above medical condition listed as is.     [unfilled]

## 2022-01-03 ENCOUNTER — OFFICE VISIT (OUTPATIENT)
Dept: CARDIOLOGY CLINIC | Age: 72
End: 2022-01-03
Payer: MEDICARE

## 2022-01-03 VITALS
HEART RATE: 70 BPM | SYSTOLIC BLOOD PRESSURE: 122 MMHG | HEIGHT: 60 IN | BODY MASS INDEX: 43.11 KG/M2 | DIASTOLIC BLOOD PRESSURE: 68 MMHG | WEIGHT: 219.6 LBS

## 2022-01-03 DIAGNOSIS — E66.9 OBESITY (BMI 35.0-39.9 WITHOUT COMORBIDITY): Primary | ICD-10-CM

## 2022-01-03 PROCEDURE — 4040F PNEUMOC VAC/ADMIN/RCVD: CPT | Performed by: INTERNAL MEDICINE

## 2022-01-03 PROCEDURE — 1036F TOBACCO NON-USER: CPT | Performed by: INTERNAL MEDICINE

## 2022-01-03 PROCEDURE — 1123F ACP DISCUSS/DSCN MKR DOCD: CPT | Performed by: INTERNAL MEDICINE

## 2022-01-03 PROCEDURE — 1090F PRES/ABSN URINE INCON ASSESS: CPT | Performed by: INTERNAL MEDICINE

## 2022-01-03 PROCEDURE — 99214 OFFICE O/P EST MOD 30 MIN: CPT | Performed by: INTERNAL MEDICINE

## 2022-01-03 PROCEDURE — G8417 CALC BMI ABV UP PARAM F/U: HCPCS | Performed by: INTERNAL MEDICINE

## 2022-01-03 PROCEDURE — G8400 PT W/DXA NO RESULTS DOC: HCPCS | Performed by: INTERNAL MEDICINE

## 2022-01-03 PROCEDURE — 3017F COLORECTAL CA SCREEN DOC REV: CPT | Performed by: INTERNAL MEDICINE

## 2022-01-03 PROCEDURE — G8427 DOCREV CUR MEDS BY ELIG CLIN: HCPCS | Performed by: INTERNAL MEDICINE

## 2022-01-03 PROCEDURE — G8484 FLU IMMUNIZE NO ADMIN: HCPCS | Performed by: INTERNAL MEDICINE

## 2022-01-03 RX ORDER — PHENTERMINE HYDROCHLORIDE 37.5 MG/1
37.5 TABLET ORAL
Qty: 30 TABLET | Refills: 0 | Status: SHIPPED | OUTPATIENT
Start: 2022-01-03 | End: 2022-02-02

## 2022-01-03 NOTE — PROGRESS NOTES
Tayla Romero MD        OFFICE  FOLLOWUP NOTE    Chief complaints: patient is here for management of dizziness, obesity, HTN, dyslipidemia      F/u on adipex    Subjective: patient feels better, no chest pain, no shortness of breath, no dizziness, no palpitations    HPI Bruna is a 80 y. o.year old who  has a past medical history of H/O echocardiogram, History of nuclear stress test, Hx of exercise stress test, Hyperlipidemia, Hypertension, and Nocturia. and presents for management of dizziness, obesity, HTN, dyslipidemia which are well controlled      Current Outpatient Medications   Medication Sig Dispense Refill    triamterene-hydroCHLOROthiazide (DYAZIDE) 37.5-25 MG per capsule       sertraline (ZOLOFT) 50 MG tablet Take 50 mg by mouth daily      metoprolol tartrate (LOPRESSOR) 25 MG tablet Take 1 tablet by mouth 2 times daily 180 tablet 3    losartan (COZAAR) 25 MG tablet Take 1 tablet by mouth daily 180 tablet 3    Biotin 1000 MCG TABS Take by mouth      aspirin 81 MG tablet Take 81 mg by mouth daily      amLODIPine (NORVASC) 10 MG tablet Take 10 mg by mouth daily      rosuvastatin (CRESTOR) 5 MG tablet Take 5 mg by mouth daily      [employer] FIBER GUMMIES PO Take by mouth (Patient not taking: Reported on 7/22/2021)      vitamin C (ASCORBIC ACID) 500 MG tablet Take 500 mg by mouth daily (Patient not taking: Reported on 12/3/2021)      Melatonin 5 MG CAPS Take by mouth nightly  (Patient not taking: Reported on 12/3/2021)      Insulin Pen Needle 32G X 6 MM MISC Dispense 1, 50 pack box. (Patient not taking: Reported on 11/4/2021) 1 each 0    Alcohol Swabs 70 % PADS daily (Patient not taking: Reported on 11/4/2021) 100 each 0    Vitamin D, Ergocalciferol, 50 MCG (2000 UT) CAPS Take 1,000 Units by mouth Daily (Patient not taking: Reported on 12/3/2021)       No current facility-administered medications for this visit. Allergies: Patient has no known allergies.   Past Medical History: Diagnosis Date    H/O echocardiogram 09/11/2020    EF 55-60%, Mild MR, TR & LVH.  History of nuclear stress test 09/03/2020    EF 69%, ABN, Mild degree of lateral wall ischemia, Exercise induced frequent PVCs and ventricular couplets noted    Hx of exercise stress test 07/28/2021    Treadmill    Hyperlipidemia     Hypertension     Nocturia      Past Surgical History:   Procedure Laterality Date    CARPAL TUNNEL RELEASE Bilateral     FRACTURE SURGERY      Left ankle     HERNIA REPAIR  1998    [facility] (Dr Jazmyne Villatoro)    TUBAL LIGATION       Family History   Problem Relation Age of Onset    Hypertension Mother     Stroke Mother     Parkinsonism Father      Social History     Tobacco Use    Smoking status: Never Smoker    Smokeless tobacco: Never Used   Substance Use Topics    Alcohol use: No     Comment: caffeine       [unfilled]  Review of Systems:   · Constitutional: No Fever or Weight Loss   · Eyes: No Decreased Vision  · ENT: No Headaches, Hearing Loss or Vertigo  · Cardiovascular: No chest pain, dyspnea on exertion, palpitations or loss of consciousness  · Respiratory: No cough or wheezing    · Gastrointestinal: No abdominal pain, appetite loss, blood in stools, constipation, diarrhea or heartburn  · Genitourinary: No dysuria, trouble voiding, or hematuria  · Musculoskeletal:  No gait disturbance, weakness or joint complaints  · Integumentary: No rash or pruritis  · Neurological: No TIA or stroke symptoms  · Psychiatric: No anxiety or depression  · Endocrine: No malaise, fatigue or temperature intolerance  · Hematologic/Lymphatic: No bleeding problems, blood clots or swollen lymph nodes  · Allergic/Immunologic: No nasal congestion or hives  All systems negative except as marked.    Objective:  /68   Pulse 70   Ht 5' (1.524 m)   Wt 219 lb 9.6 oz (99.6 kg)   BMI 42.89 kg/m²   Wt Readings from Last 3 Encounters:   01/03/22 219 lb 9.6 oz (99.6 kg)   12/03/21 220 lb 9.6 oz (100.1 kg)   11/04/21 231 lb (104.8 kg)     Body mass index is 42.89 kg/m². GENERAL - Alert, oriented, pleasant, in no apparent distress,normal grooming  HEENT  pupils are intact, cornea intact, conjunctive normal color, ears are normal in exam,  Neck - Supple. No jugular venous distention noted. No carotid bruits, no apical -carotid delay  Respiratory:  Normal breath sounds, No respiratory distress, No wheezing, No chest tenderness. ,no use of accessory muscles, diaphragm movement is normal  Cardiovascular: (PMI) apex non displaced,no lifts no thrills, no s3,no s4, Normal heart rate, Normal rhythm, No murmurs, No rubs, No gallops. Carotid arteries pulse and amplitude are normal no bruit, no abdominal bruit noted ( normal abdominal aorta ausculation),   Extremities - No cyanosis, clubbing, or significant edema, no varicose veins    Abdomen  No masses, tenderness, or organomegaly, no hepato-splenomegally, no bruits  Musculoskeletal  No significant edema, no kyphosis or scoliosis, no deformity in any extremity noted, muscle strength and tone are normal  Skin: no ulcer,no scar,no stasis dermatitis   Neurologic  alert oriented times 3,Cranial nerves II through XII are grossly intact. There were no gross focal neurologic abnormalities. Psychiatric: normal mood and affect    No results found for: CKTOTAL, CKMB, CKMBINDEX, TROPONINI  BNP:  No results found for: BNP  PT/INR:  No results found for: PTINR  No results found for: LABA1C  Lab Results   Component Value Date    CHOL 136 06/02/2016    TRIG 110 06/02/2016    HDL 48 06/02/2016    LDLDIRECT 76 06/02/2016     Lab Results   Component Value Date    ALT 12 06/02/2016    AST 16 06/02/2016     TSH:  No results found for: TSH    Impression:  Christian Tanner is a 80 y. o.year old who  has a past medical history of H/O echocardiogram, History of nuclear stress test, Hx of exercise stress test, Hyperlipidemia, Hypertension, and Nocturia. and presents with     Plan:  1.  Chest pain: stress test was reviewed with patient , was not impressed with ischemia diagnosis,  not recommended [facility] last  time, echo was normal,, will get treadmill to check for her dyspnea  2. Obesity: recommend to lose weight refill adipex, she lost 12  lbs in 2 months  3. Anger and mood disorder: better since started on zoloft by PMD, STABLE for now  4. Palpitations: not much aggravation with adipex, on lopressor, last time her 24 hrs holter monitor in 2020 showed atrial trachycardia at 161 bpm and > 4000 PVCs, patient was not willing to start it at that time, she finally agreed to start it, feels better now  5. Valvular heart disease: mild MR and TR noted  6. Dyslipidemia: controlled, continue crestor  7. HTN: controlled, continue norvasc, Health maintenance: exerise     All labs, medications and tests reviewed, continue all other medications of all above medical condition listed as is.

## 2022-02-14 ENCOUNTER — OFFICE VISIT (OUTPATIENT)
Dept: CARDIOLOGY CLINIC | Age: 72
End: 2022-02-14
Payer: MEDICARE

## 2022-02-14 VITALS
HEART RATE: 65 BPM | DIASTOLIC BLOOD PRESSURE: 84 MMHG | SYSTOLIC BLOOD PRESSURE: 132 MMHG | HEIGHT: 62 IN | BODY MASS INDEX: 40.19 KG/M2 | WEIGHT: 218.4 LBS

## 2022-02-14 DIAGNOSIS — E66.9 OBESITY (BMI 35.0-39.9 WITHOUT COMORBIDITY): Primary | ICD-10-CM

## 2022-02-14 PROCEDURE — 99214 OFFICE O/P EST MOD 30 MIN: CPT | Performed by: INTERNAL MEDICINE

## 2022-02-14 PROCEDURE — G8400 PT W/DXA NO RESULTS DOC: HCPCS | Performed by: INTERNAL MEDICINE

## 2022-02-14 PROCEDURE — 4040F PNEUMOC VAC/ADMIN/RCVD: CPT | Performed by: INTERNAL MEDICINE

## 2022-02-14 PROCEDURE — 1036F TOBACCO NON-USER: CPT | Performed by: INTERNAL MEDICINE

## 2022-02-14 PROCEDURE — G8484 FLU IMMUNIZE NO ADMIN: HCPCS | Performed by: INTERNAL MEDICINE

## 2022-02-14 PROCEDURE — 1090F PRES/ABSN URINE INCON ASSESS: CPT | Performed by: INTERNAL MEDICINE

## 2022-02-14 PROCEDURE — 1123F ACP DISCUSS/DSCN MKR DOCD: CPT | Performed by: INTERNAL MEDICINE

## 2022-02-14 PROCEDURE — 3017F COLORECTAL CA SCREEN DOC REV: CPT | Performed by: INTERNAL MEDICINE

## 2022-02-14 PROCEDURE — G8417 CALC BMI ABV UP PARAM F/U: HCPCS | Performed by: INTERNAL MEDICINE

## 2022-02-14 PROCEDURE — G8427 DOCREV CUR MEDS BY ELIG CLIN: HCPCS | Performed by: INTERNAL MEDICINE

## 2022-02-14 RX ORDER — PHENTERMINE AND TOPIRAMATE 3.75; 23 MG/1; MG/1
1 CAPSULE, EXTENDED RELEASE ORAL DAILY
Qty: 30 CAPSULE | Refills: 3 | Status: SHIPPED | OUTPATIENT
Start: 2022-02-14 | End: 2022-03-16

## 2022-02-14 NOTE — PROGRESS NOTES
Maximo Worthington MD        OFFICE  FOLLOWUP NOTE    Chief complaints: patient is here for management of dizziness, obesity, HTN, dyslipidemia      Subjective: patient feels better, had some dizziness and few palpitations, no chest pain, no shortness of breath,    HPI Bruna is a 80 y. o.year old who  has a past medical history of H/O echocardiogram, History of nuclear stress test, Hx of exercise stress test, Hyperlipidemia, Hypertension, and Nocturia. and presents for management of dizziness, obesity, HTN, dyslipidemia which are well controlled      She did 3 months of adipex, lost 12 lbs last month and only 1 lb this month. she felt better with lopressor  Current Outpatient Medications   Medication Sig Dispense Refill    metoprolol tartrate (LOPRESSOR) 25 MG tablet Take 1 tablet by mouth 2 times daily 180 tablet 3    triamterene-hydroCHLOROthiazide (DYAZIDE) 37.5-25 MG per capsule       sertraline (ZOLOFT) 50 MG tablet Take 50 mg by mouth daily      losartan (COZAAR) 25 MG tablet Take 1 tablet by mouth daily 180 tablet 3    Biotin 1000 MCG TABS Take by mouth      aspirin 81 MG tablet Take 81 mg by mouth daily      amLODIPine (NORVASC) 10 MG tablet Take 10 mg by mouth daily      rosuvastatin (CRESTOR) 5 MG tablet Take 5 mg by mouth daily      [employer] FIBER GUMMIES PO Take by mouth (Patient not taking: Reported on 7/22/2021)      vitamin C (ASCORBIC ACID) 500 MG tablet Take 500 mg by mouth daily (Patient not taking: Reported on 12/3/2021)      Melatonin 5 MG CAPS Take by mouth nightly  (Patient not taking: Reported on 12/3/2021)      Insulin Pen Needle 32G X 6 MM MISC Dispense 1, 50 pack box.  (Patient not taking: Reported on 11/4/2021) 1 each 0    Alcohol Swabs 70 % PADS daily (Patient not taking: Reported on 11/4/2021) 100 each 0    Vitamin D, Ergocalciferol, 50 MCG (2000 UT) CAPS Take 1,000 Units by mouth Daily (Patient not taking: Reported on 12/3/2021)       No current facility-administered medications for this visit. Allergies: Patient has no known allergies. Past Medical History:   Diagnosis Date    H/O echocardiogram 09/11/2020    EF 55-60%, Mild MR, TR & LVH.  History of nuclear stress test 09/03/2020    EF 69%, ABN, Mild degree of lateral wall ischemia, Exercise induced frequent PVCs and ventricular couplets noted    Hx of exercise stress test 07/28/2021    Treadmill    Hyperlipidemia     Hypertension     Nocturia      Past Surgical History:   Procedure Laterality Date    CARPAL TUNNEL RELEASE Bilateral     FRACTURE SURGERY      Left ankle     HERNIA REPAIR  1998    [facility] (Dr Chambers Staff)    TUBAL LIGATION       Family History   Problem Relation Age of Onset    Hypertension Mother     Stroke Mother     Parkinsonism Father      Social History     Tobacco Use    Smoking status: Never Smoker    Smokeless tobacco: Never Used   Substance Use Topics    Alcohol use: No     Comment: caffeine       [unfilled]  Review of Systems:   · Constitutional: No Fever or Weight Loss   · Eyes: No Decreased Vision  · ENT: No Headaches, Hearing Loss or Vertigo  · Cardiovascular: No chest pain, dyspnea on exertion, some palpitations or loss of consciousness  · Respiratory: No cough or wheezing    · Gastrointestinal: No abdominal pain, appetite loss, blood in stools, constipation, diarrhea or heartburn  · Genitourinary: No dysuria, trouble voiding, or hematuria  · Musculoskeletal:  No gait disturbance, weakness or joint complaints  · Integumentary: No rash or pruritis  · Neurological: No TIA or stroke symptoms  · Psychiatric: No anxiety or depression  · Endocrine: No malaise, fatigue or temperature intolerance  · Hematologic/Lymphatic: No bleeding problems, blood clots or swollen lymph nodes  · Allergic/Immunologic: No nasal congestion or hives  All systems negative except as marked.    Objective:  /84   Pulse 65   Ht 5' 2\" (1.575 m)   Wt 218 lb 6.4 oz (99.1 kg)   BMI 39.95 kg/m²   Wt Readings from Last 3 Encounters:   02/14/22 218 lb 6.4 oz (99.1 kg)   01/03/22 219 lb 9.6 oz (99.6 kg)   12/03/21 220 lb 9.6 oz (100.1 kg)     Body mass index is 39.95 kg/m². GENERAL - Alert, oriented, pleasant, in no apparent distress,normal grooming  HEENT  pupils are intact, cornea intact, conjunctive normal color, ears are normal in exam,  Neck - Supple. No jugular venous distention noted. No carotid bruits, no apical -carotid delay  Respiratory:  Normal breath sounds, No respiratory distress, No wheezing, No chest tenderness. ,no use of accessory muscles, diaphragm movement is normal  Cardiovascular: (PMI) apex non displaced,no lifts no thrills, no s3,no s4, Normal heart rate, Normal rhythm, No murmurs, No rubs, No gallops. Carotid arteries pulse and amplitude are normal no bruit, no abdominal bruit noted ( normal abdominal aorta ausculation),   Extremities - No cyanosis, clubbing, or significant edema, no varicose veins    Abdomen  No masses, tenderness, or organomegaly, no hepato-splenomegally, no bruits  Musculoskeletal  No significant edema, no kyphosis or scoliosis, no deformity in any extremity noted, muscle strength and tone are normal  Skin: no ulcer,no scar,no stasis dermatitis   Neurologic  alert oriented times 3,Cranial nerves II through XII are grossly intact. There were no gross focal neurologic abnormalities. Psychiatric: normal mood and affect    No results found for: CKTOTAL, CKMB, CKMBINDEX, TROPONINI  BNP:  No results found for: BNP  PT/INR:  No results found for: PTINR  No results found for: LABA1C  Lab Results   Component Value Date    CHOL 136 06/02/2016    TRIG 110 06/02/2016    HDL 48 06/02/2016    LDLDIRECT 76 06/02/2016     Lab Results   Component Value Date    ALT 12 06/02/2016    AST 16 06/02/2016     TSH:  No results found for: TSH    Impression:  Monika Contreras is a 80 y. o.year old who  has a past medical history of H/O echocardiogram, History of nuclear stress test, Hx of exercise stress test, Hyperlipidemia, Hypertension, and Nocturia. and presents with     Plan:  1. Chest pain: stress test was reviewed with patient , was not impressed with ischemia diagnosis,  not recommended [facility] last  time, echo was normal,, will get treadmill to check for her dyspnea  2. Obesity:will try qsymia, she already used 3 months of adipex, she lost 13 lbs so far.if insurance does not cover, will try metformin  3. Anger and mood disorder: better since started on zoloft by PMD, STABLE for now  4. Palpitations: not much aggravation with adipex, on lopressor, last time her 24 hrs holter monitor in 2020 showed atrial trachycardia at 161 bpm and > 4000 PVCs, patient was not willing to start it at that time, she finally agreed to start it, feels better now  5. Valvular heart disease: mild MR and TR noted  6. Dyslipidemia: controlled, continue crestor  7. HTN: controlled, continue norvasc, Health maintenance: exerise   8. Health maintenance: exerise and diet  All labs, medications and tests reviewed, continue all other medications of all above medical condition listed as is.

## 2022-02-14 NOTE — PATIENT INSTRUCTIONS
**It is YOUR responsibilty to bring medication bottles and/or updated medication list to [address] [place] [address]. This will allow us to better serve you and all your healthcare needs**    [facility] Locations - No appointment necessary. Sites open Monday to Friday. Call your preferred location for test preparation, business hours and other information you need. [employer] accepts [employer]. [facility] [address] Namita Roman, [address] [facility]  Phone: [phone] [facility] [address] [facility]  [address]   Jaison Jung, [address] [facility]  Phone: [phone]

## 2022-03-18 ENCOUNTER — HOSPITAL ENCOUNTER (OUTPATIENT)
Dept: ULTRASOUND IMAGING | Age: 72
Discharge: HOME | End: 2022-03-18
Payer: MEDICARE

## 2022-03-18 DIAGNOSIS — R42 DIZZINESS: ICD-10-CM

## 2022-03-18 PROCEDURE — 93880 EXTRACRANIAL BILAT STUDY: CPT

## 2022-05-26 ENCOUNTER — HOSPITAL ENCOUNTER (OUTPATIENT)
Dept: ULTRASOUND IMAGING | Age: 72
Discharge: HOME | End: 2022-05-26
Payer: MEDICARE

## 2022-05-26 DIAGNOSIS — N19 RENAL FAILURE, UNSPECIFIED CHRONICITY: ICD-10-CM

## 2022-05-26 PROCEDURE — 76770 US EXAM ABDO BACK WALL COMP: CPT

## 2022-06-23 ENCOUNTER — HOSPITAL ENCOUNTER (OUTPATIENT)
Dept: ULTRASOUND IMAGING | Age: 72
Discharge: HOME | End: 2022-06-23

## 2022-06-23 DIAGNOSIS — N19 RENAL FAILURE, UNSPECIFIED CHRONICITY: ICD-10-CM

## 2022-10-07 ENCOUNTER — HOSPITAL ENCOUNTER (OUTPATIENT)
Dept: ULTRASOUND IMAGING | Age: 72
Discharge: HOME | End: 2022-10-07
Payer: MEDICARE

## 2022-10-07 DIAGNOSIS — M79.605 LEFT LEG PAIN: ICD-10-CM

## 2022-10-07 PROCEDURE — 93971 EXTREMITY STUDY: CPT

## 2022-12-28 PROBLEM — E66.3 OVERWEIGHT: Status: ACTIVE | Noted: 2022-12-28

## 2022-12-28 PROBLEM — N18.32 STAGE 3B CHRONIC KIDNEY DISEASE (HCC): Status: ACTIVE | Noted: 2022-12-28

## 2022-12-28 PROBLEM — I10 PRIMARY HYPERTENSION: Status: ACTIVE | Noted: 2022-12-28

## 2022-12-28 PROBLEM — R35.0 URINARY FREQUENCY: Status: ACTIVE | Noted: 2022-12-28

## 2023-01-24 ENCOUNTER — OFFICE VISIT (OUTPATIENT)
Dept: FAMILY MEDICINE CLINIC | Age: 73
End: 2023-01-24
Payer: MEDICARE

## 2023-01-24 VITALS
HEART RATE: 70 BPM | DIASTOLIC BLOOD PRESSURE: 70 MMHG | OXYGEN SATURATION: 96 % | WEIGHT: 227.4 LBS | BODY MASS INDEX: 41.85 KG/M2 | SYSTOLIC BLOOD PRESSURE: 112 MMHG | HEIGHT: 62 IN

## 2023-01-24 DIAGNOSIS — Z91.81 AT HIGH RISK FOR FALLS: ICD-10-CM

## 2023-01-24 DIAGNOSIS — E78.2 MIXED HYPERLIPIDEMIA: ICD-10-CM

## 2023-01-24 DIAGNOSIS — Z78.0 MENOPAUSE: ICD-10-CM

## 2023-01-24 DIAGNOSIS — I10 PRIMARY HYPERTENSION: Primary | ICD-10-CM

## 2023-01-24 DIAGNOSIS — R73.9 HYPERGLYCEMIA: ICD-10-CM

## 2023-01-24 DIAGNOSIS — Z12.11 COLON CANCER SCREENING: ICD-10-CM

## 2023-01-24 PROCEDURE — 3078F DIAST BP <80 MM HG: CPT | Performed by: FAMILY MEDICINE

## 2023-01-24 PROCEDURE — G8400 PT W/DXA NO RESULTS DOC: HCPCS | Performed by: FAMILY MEDICINE

## 2023-01-24 PROCEDURE — 3017F COLORECTAL CA SCREEN DOC REV: CPT | Performed by: FAMILY MEDICINE

## 2023-01-24 PROCEDURE — G8427 DOCREV CUR MEDS BY ELIG CLIN: HCPCS | Performed by: FAMILY MEDICINE

## 2023-01-24 PROCEDURE — G8484 FLU IMMUNIZE NO ADMIN: HCPCS | Performed by: FAMILY MEDICINE

## 2023-01-24 PROCEDURE — 99203 OFFICE O/P NEW LOW 30 MIN: CPT | Performed by: FAMILY MEDICINE

## 2023-01-24 PROCEDURE — 3074F SYST BP LT 130 MM HG: CPT | Performed by: FAMILY MEDICINE

## 2023-01-24 PROCEDURE — 1036F TOBACCO NON-USER: CPT | Performed by: FAMILY MEDICINE

## 2023-01-24 PROCEDURE — G8417 CALC BMI ABV UP PARAM F/U: HCPCS | Performed by: FAMILY MEDICINE

## 2023-01-24 PROCEDURE — 1123F ACP DISCUSS/DSCN MKR DOCD: CPT | Performed by: FAMILY MEDICINE

## 2023-01-24 PROCEDURE — 1090F PRES/ABSN URINE INCON ASSESS: CPT | Performed by: FAMILY MEDICINE

## 2023-01-24 RX ORDER — LOSARTAN POTASSIUM 25 MG/1
25 TABLET ORAL DAILY
Qty: 90 TABLET | Refills: 3 | Status: SHIPPED | OUTPATIENT
Start: 2023-01-24

## 2023-01-24 RX ORDER — ROSUVASTATIN CALCIUM 5 MG/1
5 TABLET, COATED ORAL DAILY
Qty: 90 TABLET | Refills: 3 | Status: SHIPPED | OUTPATIENT
Start: 2023-01-24

## 2023-01-24 RX ORDER — AMLODIPINE BESYLATE 10 MG/1
10 TABLET ORAL DAILY
Qty: 90 TABLET | Refills: 3 | Status: SHIPPED | OUTPATIENT
Start: 2023-01-24

## 2023-01-24 RX ORDER — TRIAMTERENE AND HYDROCHLOROTHIAZIDE 37.5; 25 MG/1; MG/1
1 CAPSULE ORAL DAILY
Qty: 90 CAPSULE | Refills: 3 | Status: SHIPPED | OUTPATIENT
Start: 2023-01-24

## 2023-01-24 SDOH — ECONOMIC STABILITY: FOOD INSECURITY: WITHIN THE PAST 12 MONTHS, THE FOOD YOU BOUGHT JUST DIDN'T LAST AND YOU DIDN'T HAVE MONEY TO GET MORE.: PATIENT DECLINED

## 2023-01-24 SDOH — ECONOMIC STABILITY: FOOD INSECURITY: WITHIN THE PAST 12 MONTHS, YOU WORRIED THAT YOUR FOOD WOULD RUN OUT BEFORE YOU GOT MONEY TO BUY MORE.: PATIENT DECLINED

## 2023-01-24 ASSESSMENT — SOCIAL DETERMINANTS OF HEALTH (SDOH): HOW HARD IS IT FOR YOU TO PAY FOR THE VERY BASICS LIKE FOOD, HOUSING, MEDICAL CARE, AND HEATING?: PATIENT DECLINED

## 2023-01-24 ASSESSMENT — PATIENT HEALTH QUESTIONNAIRE - PHQ9
SUM OF ALL RESPONSES TO PHQ QUESTIONS 1-9: 9
5. POOR APPETITE OR OVEREATING: 0
8. MOVING OR SPEAKING SO SLOWLY THAT OTHER PEOPLE COULD HAVE NOTICED. OR THE OPPOSITE, BEING SO FIGETY OR RESTLESS THAT YOU HAVE BEEN MOVING AROUND A LOT MORE THAN USUAL: 0
6. FEELING BAD ABOUT YOURSELF - OR THAT YOU ARE A FAILURE OR HAVE LET YOURSELF OR YOUR FAMILY DOWN: 1
3. TROUBLE FALLING OR STAYING ASLEEP: 3
9. THOUGHTS THAT YOU WOULD BE BETTER OFF DEAD, OR OF HURTING YOURSELF: 0
DEPRESSION UNABLE TO ASSESS: FUNCTIONAL CAPACITY MOTIVATION LIMITS ACCURACY
2. FEELING DOWN, DEPRESSED OR HOPELESS: 0
1. LITTLE INTEREST OR PLEASURE IN DOING THINGS: 3
10. IF YOU CHECKED OFF ANY PROBLEMS, HOW DIFFICULT HAVE THESE PROBLEMS MADE IT FOR YOU TO DO YOUR WORK, TAKE CARE OF THINGS AT HOME, OR GET ALONG WITH OTHER PEOPLE: 1
7. TROUBLE CONCENTRATING ON THINGS, SUCH AS READING THE NEWSPAPER OR WATCHING TELEVISION: 0
SUM OF ALL RESPONSES TO PHQ QUESTIONS 1-9: 9
SUM OF ALL RESPONSES TO PHQ9 QUESTIONS 1 & 2: 3
4. FEELING TIRED OR HAVING LITTLE ENERGY: 2

## 2023-01-24 NOTE — PROGRESS NOTES
Arnol Blunt  1950  01/25/23    Chief Complaint   Patient presents with    New Patient           71years old lady with past medical history of hypertension, hyperlipidemia, came today to establish with us. The patient is taking hypertensive medications compliantly without side effects. Denies chest pain, dyspnea, edema, or TIA's. She does take her cholesterol medication with no side effect      Past Medical History:   Diagnosis Date    H/O echocardiogram 09/11/2020    EF 55-60%, Mild MR, TR & LVH.     History of nuclear stress test 09/03/2020    EF 69%, ABN, Mild degree of lateral wall ischemia, Exercise induced frequent PVCs and ventricular couplets noted    Hx of exercise stress test 07/28/2021    Treadmill    Hyperlipidemia     Hypertension     Nocturia      Past Surgical History:   Procedure Laterality Date    CARPAL TUNNEL RELEASE Bilateral     FRACTURE SURGERY      Left ankle     HERNIA REPAIR  1998    [facility] (Dr Mable William)    TUBAL LIGATION       Family History   Problem Relation Age of Onset    Hypertension Mother     Stroke Mother     Parkinsonism Father      Social History     Socioeconomic History    Marital status:      Spouse name: Not on file    Number of children: Not on file    Years of education: Not on file    Highest education level: Not on file   Occupational History    Not on file   Tobacco Use    Smoking status: Never    Smokeless tobacco: Never   Vaping Use    Vaping Use: Never used   Substance and Sexual Activity    Alcohol use: No     Comment: caffeine     Drug use: Never    Sexual activity: Not on file   Other Topics Concern    Not on file   Social History Narrative    Not on file     Social Determinants of Health     Financial Resource Strain: Unknown    Difficulty of Paying Living Expenses: Patient refused   Food Insecurity: Unknown    Worried About Running Out of Food in the Last Year: Patient refused    [address] in the Last Year: Patient refused Transportation Needs: Not on file   Physical Activity: Not on file   Stress: Not on file   Social Connections: Not on file   Intimate Partner Violence: Not on file   Housing Stability: Not on file       No Known Allergies  Current Outpatient Medications   Medication Sig Dispense Refill    metoprolol tartrate (LOPRESSOR) 25 MG tablet Take 1 tablet by mouth 2 times daily 180 tablet 3    triamterene-hydroCHLOROthiazide (DYAZIDE) 37.5-25 MG per capsule Take 1 capsule by mouth daily 90 capsule 3    losartan (COZAAR) 25 MG tablet Take 1 tablet by mouth daily 90 tablet 3    amLODIPine (NORVASC) 10 MG tablet Take 1 tablet by mouth daily 90 tablet 3    rosuvastatin (CRESTOR) 5 MG tablet Take 1 tablet by mouth daily 90 tablet 3    solifenacin (VESICARE) 10 MG tablet Take 5 mg by mouth daily      acetaminophen (TYLENOL) 325 MG tablet Take 650 mg by mouth as needed for Pain      Biotin 1000 MCG TABS Take by mouth as needed      Melatonin 5 MG CAPS Take by mouth nightly       No current facility-administered medications for this visit. Review of Systems   Constitutional:  Negative for activity change, appetite change, chills, fatigue and fever. HENT:  Negative for congestion, postnasal drip, sinus pressure and sore throat. Respiratory:  Negative for cough, chest tightness, shortness of breath and wheezing. Cardiovascular:  Negative for chest pain and leg swelling. Gastrointestinal:  Negative for abdominal pain, constipation and diarrhea. Genitourinary:  Negative for dysuria and frequency. Musculoskeletal:  Negative for back pain and gait problem. Skin:  Negative for rash. Neurological:  Negative for dizziness, weakness and headaches. Psychiatric/Behavioral:  Negative for agitation, behavioral problems and sleep disturbance. The patient is not nervous/anxious.       Lab Results   Component Value Date    WBC 5.5 07/11/2014    HGB 12.8 07/11/2014    HCT 38.4 07/11/2014    MCV 91.1 07/11/2014     07/11/2014     Lab Results   Component Value Date     08/21/2020    K 4.6 08/21/2020     08/21/2020    CO2 30 08/21/2020    BUN 19 08/21/2020    CREATININE 1.0 08/21/2020    GLUCOSE 123 (H) 08/21/2020    CALCIUM 10.4 08/21/2020    PROT 6.4 06/02/2016    LABALBU 4.2 06/02/2016    BILITOT 0.6 06/02/2016    ALKPHOS 68 06/02/2016    AST 16 06/02/2016    ALT 12 06/02/2016    LABGLOM 55 (L) 08/21/2020    GFRAA >60 08/21/2020     Lab Results   Component Value Date    CHOL 136 06/02/2016    CHOL 118 03/08/2016    CHOL 108 12/07/2015     Lab Results   Component Value Date    TRIG 110 06/02/2016    TRIG 95 03/08/2016    TRIG 119 12/07/2015     Lab Results   Component Value Date    HDL 48 06/02/2016    HDL 49 03/08/2016    HDL 46 12/07/2015     No results found for: LDLCALC, LDLCHOLESTEROL  No results found for: LABA1C  Lab Results   Component Value Date    TSHHS 2.840 08/21/2020         /70 (Site: Right Upper Arm, Position: Sitting, Cuff Size: Medium Adult)   Pulse 70   Ht 5' 2\" (1.575 m)   Wt 227 lb 6.4 oz (103.1 kg)   SpO2 96%   BMI 41.59 kg/m²     BP Readings from Last 3 Encounters:   01/24/23 112/70   12/28/22 134/80   02/14/22 132/84       Wt Readings from Last 3 Encounters:   01/24/23 227 lb 6.4 oz (103.1 kg)   12/28/22 227 lb (103 kg)   02/14/22 218 lb 6.4 oz (99.1 kg)         Physical Exam  Constitutional:       General: She is not in acute distress. Appearance: Normal appearance. She is well-developed. She is obese. She is not diaphoretic. HENT:      Head: Normocephalic and atraumatic. Right Ear: Tympanic membrane normal. There is no impacted cerumen. Left Ear: Tympanic membrane normal. There is no impacted cerumen. Nose: Nose normal. No congestion. Mouth/Throat:      Mouth: Mucous membranes are moist.      Pharynx: No oropharyngeal exudate or posterior oropharyngeal erythema. Eyes:      General: No scleral icterus.      Pupils: Pupils are equal, round, and reactive to light. Cardiovascular:      Rate and Rhythm: Normal rate and regular rhythm. Heart sounds: Normal heart sounds. No murmur heard. Pulmonary:      Effort: Pulmonary effort is normal.      Breath sounds: Normal breath sounds. No wheezing. Abdominal:      General: There is no distension. Palpations: Abdomen is soft. There is no mass. Tenderness: There is no abdominal tenderness. Musculoskeletal:         General: Normal range of motion. Cervical back: Normal range of motion and neck supple. No rigidity. Right lower leg: No edema. Left lower leg: No edema. Skin:     Findings: No rash. Neurological:      General: No focal deficit present. Mental Status: She is alert and oriented to person, place, and time. Cranial Nerves: No cranial nerve deficit. Psychiatric:         Mood and Affect: Mood normal.         Behavior: Behavior normal.         Thought Content: Thought content normal.         Judgment: Judgment normal.       ASSESSMENT/ PLAN:    1. Primary hypertension  -Stable she is on losartan amlodipine Dyazide and metoprolol    2. Mixed hyperlipidemia  -Stable she is on Crestor 5 mg  - Lipid Panel; Future    3. Hyperglycemia  - Hemoglobin A1C; Future    4. At high risk for falls  -Doing fine no assisting walking device    5. Colon cancer screening  - Fecal DNA Colorectal cancer screening (Cologuard)    6. Menopause  - DEXA BONE DENSITY AXIAL SKELETON; Future            - Appropriate prescription are addressed. - After visit summery provided. - Questions answered and patient verbalizes understanding.  - Call for any problem, questions, or concerns. Return in about 6 months (around 7/24/2023).

## 2023-01-25 ASSESSMENT — ENCOUNTER SYMPTOMS
CHEST TIGHTNESS: 0
BACK PAIN: 0
DIARRHEA: 0
ABDOMINAL PAIN: 0
WHEEZING: 0
SORE THROAT: 0
SHORTNESS OF BREATH: 0
CONSTIPATION: 0
COUGH: 0
SINUS PRESSURE: 0

## 2023-01-26 DIAGNOSIS — R73.9 HYPERGLYCEMIA: ICD-10-CM

## 2023-01-26 DIAGNOSIS — E78.2 MIXED HYPERLIPIDEMIA: ICD-10-CM

## 2023-01-26 LAB
CHOLESTEROL, TOTAL: 127 MG/DL (ref 0–199)
HDLC SERPL-MCNC: 48 MG/DL (ref 40–60)
LDL CHOLESTEROL CALCULATED: 55 MG/DL
TRIGL SERPL-MCNC: 121 MG/DL (ref 0–150)
VLDLC SERPL CALC-MCNC: 24 MG/DL

## 2023-01-27 LAB
ESTIMATED AVERAGE GLUCOSE: 128.4 MG/DL
HBA1C MFR BLD: 6.1 %

## 2023-02-10 ENCOUNTER — HOSPITAL ENCOUNTER (OUTPATIENT)
Dept: WOMENS IMAGING | Age: 73
Discharge: HOME | End: 2023-02-10
Payer: MEDICARE

## 2023-02-10 DIAGNOSIS — Z78.0 MENOPAUSE: ICD-10-CM

## 2023-02-10 PROCEDURE — 77080 DXA BONE DENSITY AXIAL: CPT

## 2023-02-27 DIAGNOSIS — Z12.11 COLON CANCER SCREENING: Primary | ICD-10-CM

## 2023-03-01 ENCOUNTER — TELEPHONE (OUTPATIENT)
Dept: GASTROENTEROLOGY | Age: 73
End: 2023-03-01

## 2023-03-01 NOTE — TELEPHONE ENCOUNTER
Called pt. In regards to a referral for a +cologuard.  Made appt for pt to see yusra on 3/15/23 @3pm

## 2023-03-02 ENCOUNTER — HOSPITAL ENCOUNTER (OUTPATIENT)
Dept: SLEEP CENTER | Age: 73
Discharge: HOME | End: 2023-03-02
Payer: MEDICARE

## 2023-03-02 VITALS — HEIGHT: 60 IN | WEIGHT: 225 LBS | BODY MASS INDEX: 44.17 KG/M2

## 2023-03-02 DIAGNOSIS — G47.33 OSA (OBSTRUCTIVE SLEEP APNEA): ICD-10-CM

## 2023-03-02 DIAGNOSIS — G47.10 HYPERSOMNIA: ICD-10-CM

## 2023-03-02 DIAGNOSIS — E66.01 MORBID OBESITY WITH BMI OF 40.0-44.9, ADULT (HCC): ICD-10-CM

## 2023-03-02 PROCEDURE — 99204 OFFICE O/P NEW MOD 45 MIN: CPT | Performed by: INTERNAL MEDICINE

## 2023-03-02 PROCEDURE — 99211 OFF/OP EST MAY X REQ PHY/QHP: CPT

## 2023-03-02 ASSESSMENT — SLEEP AND FATIGUE QUESTIONNAIRES
HOW LIKELY ARE YOU TO NOD OFF OR FALL ASLEEP WHILE LYING DOWN TO REST IN THE AFTERNOON WHEN CIRCUMSTANCES PERMIT: 3
HOW LIKELY ARE YOU TO NOD OFF OR FALL ASLEEP WHILE SITTING AND READING: 3
HOW LIKELY ARE YOU TO NOD OFF OR FALL ASLEEP WHILE SITTING INACTIVE IN A PUBLIC PLACE: 0
ESS TOTAL SCORE: 12
NECK CIRCUMFERENCE (INCHES): 14.75
HOW LIKELY ARE YOU TO NOD OFF OR FALL ASLEEP IN A CAR, WHILE STOPPED FOR A FEW MINUTES IN TRAFFIC: 0
HOW LIKELY ARE YOU TO NOD OFF OR FALL ASLEEP WHILE SITTING QUIETLY AFTER LUNCH WITHOUT ALCOHOL: 2
HOW LIKELY ARE YOU TO NOD OFF OR FALL ASLEEP WHILE SITTING AND TALKING TO SOMEONE: 0
HOW LIKELY ARE YOU TO NOD OFF OR FALL ASLEEP WHILE WATCHING TV: 3
HOW LIKELY ARE YOU TO NOD OFF OR FALL ASLEEP WHEN YOU ARE A PASSENGER IN A CAR FOR AN HOUR WITHOUT A BREAK: 1

## 2023-03-02 NOTE — CONSULTS
[facility]      Isac Delong MD, FACP, [facility]  Hong Benson MD, [facility]  Stevie Dueñas MD, [facility]      [address]  [address]  [place] [address]   PH: [phone]  F: [phone]     Subjective:     Patient ID: Brittany Negrete is a 72 y.o. female, referred to the sleep center for   Chief Complaint   Patient presents with    Consultation    Snoring     R06.83    Other     R35.1 - Nocturia  R68.2 - Dry mouth       .    Referring physician:  Omi Harvey MD     History:has been referred for the RAJ    Symptoms:   [x]  Snoring                                                                    [x]  Dry Mouth  []  Choking                                                                   [x]  Morning Headaches  []  Gasping for Air                                                        []  Trouble Falling asleep  [x]  Tired during the daytime                                         []  Trouble Staying Asleep  [x]  Tired when you wake up                                         [x]  Weight Gain in Last 5 Years  [x]  Wake up frequently at night                                    []  Weight Loss in Last 5 Years  []  Shortness Of Breath                                               []  Shift Worker  []  Coughing                                                                []  Smoker (Previous or Current)  []  Chest Pain                                                              []  Anxiety  []  Trouble keeping your legs still at night                   []  Depression  []  Kicking your legs in your sleep                               []  Insomnia     [x] Palpitation  []   Stop breathing      []  Other:     Significant Co-morbidities:  []  Congestive Heart Failure     []  COPD         []  Stroke (Past 30 Days)      []  Supplemental Oxygen Usage       []  Cognitive Impairment      []  Neuromuscular Problems  []  Epilepsy/Neurological Disorders           Social History  Socioeconomic History    Marital status:      Spouse name: Not on file    Number of children: Not on file    Years of education: Not on file    Highest education level: Not on file   Occupational History    Not on file   Tobacco Use    Smoking status: Never    Smokeless tobacco: Never   Vaping Use    Vaping Use: Never used   Substance and Sexual Activity    Alcohol use: No     Comment: caffeine     Drug use: Never    Sexual activity: Not on file   Other Topics Concern    Not on file   Social History Narrative    Not on file     Social Determinants of Health     Financial Resource Strain: Unknown    Difficulty of Paying Living Expenses: Patient refused   Food Insecurity: Unknown    Worried About Running Out of Food in the Last Year: Patient refused    Ran Out of Food in the Last Year: Patient refused   Transportation Needs: Not on file   Physical Activity: Not on file   Stress: Not on file   Social Connections: Not on file   Intimate Partner Violence: Not on file   Housing Stability: Not on file       Prior to Admission medications    Medication Sig Start Date End Date Taking?  Authorizing Provider   MYRBETRIQ 50 MG TB24 Take 50 mg by mouth daily 1/31/23  Yes Historical Provider, MD   metoprolol tartrate (LOPRESSOR) 25 MG tablet Take 1 tablet by mouth 2 times daily 1/24/23  Yes Lilliana Horn MD   triamterene-hydroCHLOROthiazide (DYAZIDE) 37.5-25 MG per capsule Take 1 capsule by mouth daily 1/24/23  Yes Lilliana Horn MD   losartan (COZAAR) 25 MG tablet Take 1 tablet by mouth daily 1/24/23  Yes Lilliana Horn MD   amLODIPine (NORVASC) 10 MG tablet Take 1 tablet by mouth daily 1/24/23  Yes Lilliana Horn MD   rosuvastatin (CRESTOR) 5 MG tablet Take 1 tablet by mouth daily 1/24/23  Yes Lilliana Horn MD   solifenacin (VESICARE) 10 MG tablet Take 5 mg by mouth daily   Yes Historical Provider, MD   acetaminophen (TYLENOL) 325 MG tablet Take 650 mg by mouth as needed for Pain   Yes Historical Provider, MD Melatonin 5 MG CAPS Take by mouth as needed   Yes Historical Provider, MD   Biotin 1000 MCG TABS Take by mouth as needed  Patient not taking: Reported on 3/2/2023    Historical Provider, MD       Allergies as of 03/02/2023    (No Known Allergies)       Patient Active Problem List   Diagnosis    Atrial tachycardia ([address]    Palpitations    Near syncope    Primary hypertension    Overweight    Urinary frequency    Stage 3b chronic kidney disease (HCC)    RAJ (obstructive sleep apnea)    Hypersomnia    Morbid obesity with BMI of 40.0-44.9, adult [facility])       Past Medical History:   Diagnosis Date    H/O echocardiogram 09/11/2020    EF 55-60%, Mild MR, TR & LVH. History of nuclear stress test 09/03/2020    EF 69%, ABN, Mild degree of lateral wall ischemia, Exercise induced frequent PVCs and ventricular couplets noted    Hx of exercise stress test 07/28/2021    Treadmill    Hyperlipidemia     Hypertension     Nocturia        Past Surgical History:   Procedure Laterality Date    CARPAL TUNNEL RELEASE Bilateral     FRACTURE SURGERY      Left ankle     HERNIA REPAIR  1998    [facility] (Dr Salvador Lyon)    TUBAL LIGATION         Family History   Problem Relation Age of Onset    Hypertension Mother     Stroke Mother     Parkinsonism Father          Objective:   Ht 5' (1.524 m)   Wt 225 lb (102.1 kg)   BMI 43.94 kg/m²   Body mass index is 43.94 kg/m².   Sleep Medicine 3/2/2023   Sitting and reading 3   Watching TV 3   Sitting, inactive in a public place (e.g. a theatre or a meeting) 0   As a passenger in a car for an hour without a break 1   Lying down to rest in the afternoon when circumstances permit 3   Sitting and talking to someone 0   Sitting quietly after a lunch without alcohol 2   In a car, while stopped for a few minutes in traffic 0   [place] Sleepiness Score 12   Neck circumference (Inches) 14.75     Mallampati 3    Vitals:    03/02/23 1106   Weight: 225 lb (102.1 kg)   Height: 5' (1.524 m)     Neck circumference (Inches): 14.75  Inches  [place] Sleepiness Score: 12    Gen: No distress. Eyes: PERRL. No sclera icterus. No conjunctival injection. ENT: No discharge. Pharynx clear. External appearance of ears and nose normal.OVERJET  Neck: Trachea midline. No obvious mass. Resp: No accessory muscle use. No crackles. No wheezes. No rhonchi. No dullness on percussion. CV: Regular rate. Regular rhythm. No murmur or rub. No edema. GI: Non-tender. Non-distended. No hernia. Skin: Warm, dry, normal texture and turgor. No nodule on exposed extremities. Lymph: No cervical LAD. No supraclavicular LAD. M/S: No cyanosis. No clubbing. No joint deformity. Psych: Oriented x 3. No anxiety. Awake. Alert. Intact judgement and insight. Mallampati Airway Classification:   []1 []2 [x]3 []4        Assessment and Plan     Diagnosis:    Problem List          Respiratory    RAJ (obstructive sleep apnea)      She has symptoms of RAJ  Advised to go for the PSG  Loose weight         Relevant Orders    Baseline Diagnostic Sleep Study       Other    Hypersomnia      She has symptoms of RAJ  Advised to go for the PSG  Loose weight           Morbid obesity with BMI of 40.0-44.9, adult ([facility] [address]      Advised to loose weight with diet and exercise                    Additional Plan:     [x]  Sleep hygiene/ relaxation methods & CBTi principles review with patient   [x]  Avoid supine/back sleep until sleep study   [x]  Driving precautions   [x]  Medical consequences of untreated RAJ   [x]  Weight loss recommendations   [x]  Diet recommendations   [x]  Exercise   []  Advised to quit smoking       []  PFT referral   []  Bariatric Program referral      Follow-Up:    No follow-ups on file.     Electronically signed by Rock Judy MD on 3/2/2023 at 11:16 AM

## 2023-03-15 ENCOUNTER — OFFICE VISIT (OUTPATIENT)
Dept: GASTROENTEROLOGY | Age: 73
End: 2023-03-15
Payer: MEDICARE

## 2023-03-15 VITALS
SYSTOLIC BLOOD PRESSURE: 134 MMHG | OXYGEN SATURATION: 95 % | HEART RATE: 67 BPM | WEIGHT: 229.2 LBS | HEIGHT: 60 IN | BODY MASS INDEX: 45 KG/M2 | DIASTOLIC BLOOD PRESSURE: 82 MMHG | TEMPERATURE: 97.5 F

## 2023-03-15 DIAGNOSIS — R19.5 POSITIVE COLORECTAL CANCER SCREENING USING COLOGUARD TEST: Primary | ICD-10-CM

## 2023-03-15 DIAGNOSIS — Z80.0 FAMILY HISTORY OF COLON CANCER: ICD-10-CM

## 2023-03-15 DIAGNOSIS — K59.01 SLOW TRANSIT CONSTIPATION: ICD-10-CM

## 2023-03-15 PROCEDURE — 1090F PRES/ABSN URINE INCON ASSESS: CPT | Performed by: NURSE PRACTITIONER

## 2023-03-15 PROCEDURE — 99214 OFFICE O/P EST MOD 30 MIN: CPT | Performed by: NURSE PRACTITIONER

## 2023-03-15 PROCEDURE — G8427 DOCREV CUR MEDS BY ELIG CLIN: HCPCS | Performed by: NURSE PRACTITIONER

## 2023-03-15 PROCEDURE — 3078F DIAST BP <80 MM HG: CPT | Performed by: NURSE PRACTITIONER

## 2023-03-15 PROCEDURE — G8484 FLU IMMUNIZE NO ADMIN: HCPCS | Performed by: NURSE PRACTITIONER

## 2023-03-15 PROCEDURE — G8399 PT W/DXA RESULTS DOCUMENT: HCPCS | Performed by: NURSE PRACTITIONER

## 2023-03-15 PROCEDURE — 1123F ACP DISCUSS/DSCN MKR DOCD: CPT | Performed by: NURSE PRACTITIONER

## 2023-03-15 PROCEDURE — 1036F TOBACCO NON-USER: CPT | Performed by: NURSE PRACTITIONER

## 2023-03-15 PROCEDURE — 3074F SYST BP LT 130 MM HG: CPT | Performed by: NURSE PRACTITIONER

## 2023-03-15 PROCEDURE — 3017F COLORECTAL CA SCREEN DOC REV: CPT | Performed by: NURSE PRACTITIONER

## 2023-03-15 PROCEDURE — G8417 CALC BMI ABV UP PARAM F/U: HCPCS | Performed by: NURSE PRACTITIONER

## 2023-03-15 RX ORDER — POLYETHYLENE GLYCOL 3350, SODIUM SULFATE ANHYDROUS, SODIUM BICARBONATE, SODIUM CHLORIDE, POTASSIUM CHLORIDE 236; 22.74; 6.74; 5.86; 2.97 G/4L; G/4L; G/4L; G/4L; G/4L
4 POWDER, FOR SOLUTION ORAL ONCE
Qty: 4000 ML | Refills: 0 | Status: SHIPPED | OUTPATIENT
Start: 2023-03-15 | End: 2023-03-15

## 2023-03-15 ASSESSMENT — ENCOUNTER SYMPTOMS
EYE PAIN: 0
ABDOMINAL PAIN: 0
SHORTNESS OF BREATH: 0
COLOR CHANGE: 0
DIARRHEA: 0
CONSTIPATION: 0
VOMITING: 0
BLOOD IN STOOL: 0
COUGH: 1
NAUSEA: 0
WHEEZING: 0
PHOTOPHOBIA: 0
BACK PAIN: 1

## 2023-03-15 NOTE — PROGRESS NOTES
Amanda Kc 71 y.o. female was seen by GASTON You on 3/15/2023     Wt Readings from Last 3 Encounters:   03/15/23 229 lb 3.2 oz (104 kg)   03/02/23 225 lb (102.1 kg)   02/08/23 225 lb 12.8 oz (102.4 kg)       HPI  Amanda Kc is a pleasant 71 y.o.  female who presents today for positive cologuard. She has a past medical history of h/o echocardiogram, history of nuclear stress test, hx of exercise stress test, hyperlipidemia, hypertension, obesity, and nocturia. Her Cologuard was noted to be positive on 2-. Her last colonoscopy was done twelve years ago by Dr. Oscar Prior with per patient record normal colon. She denies changes in her bowel pattern. She recalled having constipation off and on her \"whole life\". Her typical bowel pattern is daily with soft brown formed stools. Miralax use infrequently. Colace a couple times a week helps. No diarrhea. She does have bleeding hemorrhoids with small amount bright red blood noted on toilet paper after bowel movement. No active rectal bleeding or melena. No excess belching. She has excess flatulence. Her appetite is good without early satiety. Her weight is stable. No nausea or vomiting. No abdominal pain. Intermittent bloating. No heartburn or acid reflux. No nocturnal awakenings with acid reflux. No dysphagia or pain with swallowing. No family history of stomach cancer. Her brother had colon cancer at age 50. ROS  Review of Systems   Constitutional:  Negative for appetite change, chills, diaphoresis, fatigue, fever and unexpected weight change. HENT:  Positive for tinnitus. Negative for ear pain and hearing loss. Eyes:  Positive for visual disturbance. Negative for photophobia and pain. Respiratory:  Positive for cough. Negative for shortness of breath and wheezing. Cardiovascular:  Negative for chest pain, palpitations and leg swelling.    Gastrointestinal:  Negative for abdominal pain, blood in stool, constipation, diarrhea, nausea and vomiting. Endocrine: Negative for cold intolerance, heat intolerance and polydipsia. Genitourinary:  Positive for urgency. Negative for dysuria and frequency. Musculoskeletal:  Positive for back pain. Negative for myalgias and neck pain. Skin:  Negative for color change, pallor and rash. Allergic/Immunologic: Negative for environmental allergies and food allergies. Neurological:  Negative for dizziness, seizures, weakness and headaches. Hematological:  Does not bruise/bleed easily. Psychiatric/Behavioral:  Negative for dysphoric mood, sleep disturbance and suicidal ideas. The patient is not nervous/anxious. Allergies  No Known Allergies    Medications  Current Outpatient Medications   Medication Sig Dispense Refill    Polyethylene Glycol 3350 (MIRALAX PO) Take by mouth      VITAMIN D PO Take by mouth      MYRBETRIQ 50 MG TB24 Take 50 mg by mouth daily      metoprolol tartrate (LOPRESSOR) 25 MG tablet Take 1 tablet by mouth 2 times daily 180 tablet 3    triamterene-hydroCHLOROthiazide (DYAZIDE) 37.5-25 MG per capsule Take 1 capsule by mouth daily 90 capsule 3    losartan (COZAAR) 25 MG tablet Take 1 tablet by mouth daily 90 tablet 3    amLODIPine (NORVASC) 10 MG tablet Take 1 tablet by mouth daily 90 tablet 3    rosuvastatin (CRESTOR) 5 MG tablet Take 1 tablet by mouth daily 90 tablet 3    solifenacin (VESICARE) 10 MG tablet Take 5 mg by mouth daily      acetaminophen (TYLENOL) 325 MG tablet Take 650 mg by mouth as needed for Pain      Biotin 1000 MCG TABS Take by mouth as needed      Melatonin 5 MG CAPS Take by mouth as needed       No current facility-administered medications for this visit. Past medical history:   She has a past medical history of H/O echocardiogram, History of nuclear stress test, Hx of exercise stress test, Hyperlipidemia, Hypertension, and Nocturia.     Past surgical history:  She has a past surgical history that includes fracture surgery; Tubal ligation; hernia repair (1998); and Carpal tunnel release (Bilateral). Social History:  She reports that she has never smoked. She has never used smokeless tobacco. She reports that she does not drink alcohol and does not use drugs. Family history:  Her family history includes Hypertension in her mother; Parkinsonism in her father; Stroke in her mother. Objective    Vitals:    03/15/23 1459   BP: 134/82   Pulse: 67   Temp: 97.5 °F (36.4 °C)   SpO2: 95%        Physical exam    Physical Exam  Vitals reviewed. Constitutional:       General: She is not in acute distress. Appearance: Normal appearance. She is well-developed. She is obese. She is not ill-appearing, toxic-appearing or diaphoretic. HENT:      Head: Normocephalic and atraumatic. Nose: Nose normal.      Mouth/Throat:      Mouth: Mucous membranes are moist.   Eyes:      Conjunctiva/sclera: Conjunctivae normal.      Pupils: Pupils are equal, round, and reactive to light. Neck:      Thyroid: No thyromegaly. Vascular: No JVD. Trachea: No tracheal deviation. Cardiovascular:      Rate and Rhythm: Normal rate and regular rhythm. Pulses: Normal pulses. Heart sounds: Normal heart sounds. No murmur heard. No friction rub. No gallop. Pulmonary:      Effort: Pulmonary effort is normal. No respiratory distress. Breath sounds: Normal breath sounds. No stridor. No wheezing, rhonchi or rales. Chest:      Chest wall: No tenderness. Abdominal:      General: Bowel sounds are normal. There is no distension. Palpations: Abdomen is soft. There is no mass. Tenderness: There is no abdominal tenderness. There is no guarding or rebound. Hernia: No hernia is present. Musculoskeletal:         General: Normal range of motion. Cervical back: Normal range of motion and neck supple. Lymphadenopathy:      Cervical: No cervical adenopathy. Skin:     General: Skin is warm and dry.   Neurological:      Mental Status: She is alert and oriented to person, place, and time.   Psychiatric:         Mood and Affect: Mood normal.       Orders Only on 01/26/2023   Component Date Value Ref Range Status    Color, UA 01/26/2023 Yellow  Straw/Yellow Final    Clarity, UA 01/26/2023 Clear  Clear Final    Glucose, Ur 01/26/2023 Negative  Negative mg/dL Final    Bilirubin Urine 01/26/2023 Negative  Negative Final    Ketones, Urine 01/26/2023 Negative  Negative mg/dL Final    Specific Gravity, UA 01/26/2023 1.020  1.005 - 1.030 Final    Blood, Urine 01/26/2023 Negative  Negative Final    pH, UA 01/26/2023 5.5  5.0 - 8.0 Final    Protein, UA 01/26/2023 Negative  Negative mg/dL Final    Urobilinogen, Urine 01/26/2023 0.2  <2.0 E.U./dL Final    Nitrite, Urine 01/26/2023 Negative  Negative Final    Leukocyte Esterase, Urine 01/26/2023 Negative  Negative Final    Microscopic Examination 01/26/2023 Not Indicated   Final    Urine Type 01/26/2023 Cleancatch   Final    Protein, Ur 01/26/2023 6.00  <12 mg/dL Final    Creatinine, Ur 01/26/2023 112.7  28.0 - 259.0 mg/dL Final    Protein/Creat Ratio 01/26/2023 0.1  mg/dL Final    No established reference range.    Albumin 01/26/2023 4.2  3.4 - 5.0 g/dL Final    Phosphorus 01/26/2023 3.6  2.5 - 4.9 mg/dL Final    WBC 01/26/2023 4.9  4.0 - 11.0 K/uL Final    RBC 01/26/2023 4.29  4.00 - 5.20 M/uL Final    Hemoglobin 01/26/2023 12.8  12.0 - 16.0 g/dL Final    Hematocrit 01/26/2023 39.8  36.0 - 48.0 % Final    MCV 01/26/2023 92.8  80.0 - 100.0 fL Final    MCH 01/26/2023 29.9  26.0 - 34.0 pg Final    MCHC 01/26/2023 32.3  31.0 - 36.0 g/dL Final    RDW 01/26/2023 14.6  12.4 - 15.4 % Final    Platelets 01/26/2023 296  135 - 450 K/uL Final    MPV 01/26/2023 9.5  5.0 - 10.5 fL Final    Neutrophils % 01/26/2023 60.2  % Final    Lymphocytes % 01/26/2023 28.7  % Final    Monocytes % 01/26/2023 9.0  % Final    Eosinophils % 01/26/2023 1.6  % Final    Basophils % 01/26/2023 0.5  % Final  Neutrophils Absolute 01/26/2023 3.0  1.7 - 7.7 K/uL Final    Lymphocytes Absolute 01/26/2023 1.4  1.0 - 5.1 K/uL Final    Monocytes Absolute 01/26/2023 0.4  0.0 - 1.3 K/uL Final    Eosinophils Absolute 01/26/2023 0.1  0.0 - 0.6 K/uL Final    Basophils Absolute 01/26/2023 0.0  0.0 - 0.2 K/uL Final    Sodium 01/26/2023 141  136 - 145 mmol/L Final    Potassium 01/26/2023 5.0  3.5 - 5.1 mmol/L Final    Chloride 01/26/2023 104  99 - 110 mmol/L Final    CO2 01/26/2023 23  21 - 32 mmol/L Final    Anion Gap 01/26/2023 14  3 - 16 Final    Glucose 01/26/2023 88  70 - 99 mg/dL Final    BUN 01/26/2023 32 (A)  7 - 20 mg/dL Final    Creatinine 01/26/2023 1.4 (A)  0.6 - 1.2 mg/dL Final    Est, Glom Filt Rate 01/26/2023 40 (A)  >60 Final    Comment: Pediatric calculator link  [facility].at. org/professionals/kdoqi/gfr_calculatorped  Effective Oct 3, 2022  These results are not intended for use in patients  <25years of age. eGFR results are calculated without  a race factor using the 2021 CKD-EPI equation. Careful  clinical correlation is recommended, particularly when  comparing to results calculated using previous equations. The CKD-EPI equation is less accurate in patients with  extremes of muscle mass, extra-renal metabolism of  creatinine, excessive creatinine ingestion, or following  therapy that affects renal tubular secretion.       Calcium 01/26/2023 9.9  8.3 - 10.6 mg/dL Final    C3 Complement 01/26/2023 126.4  90.0 - 180.0 mg/dL Final    C4 Complement 01/26/2023 31.8  10.0 - 40.0 mg/dL Final    Total Protein 01/26/2023 6.5  6.4 - 8.2 g/dL Final    Albumin 01/26/2023 3.7  3.1 - 4.9 g/dL Final    Alpha-1-Globulin 01/26/2023 0.2  0.2 - 0.4 g/dL Final    Alpha-2-Globulin 01/26/2023 0.8  0.4 - 1.1 g/dL Final    Beta Globulin 01/26/2023 1.0  0.9 - 1.6 g/dL Final    Gamma Globulin 01/26/2023 0.9  0.6 - 1.8 g/dL Final    Protein, Ur 01/26/2023 8.00  <12 mg/dL Final    Protein, Ur 01/26/2023 0.008  <0.012 g/dL Final UR Electrophoresis Int 01/26/2023 REVIEWED   Final    Comment: Normal urine protein electrophoresis pattern. If suspicion  for monoclonal free light chain (Bence Swift protein) is high,  immunofixation can be performed on a 24-hr specimen. First  morning urine may be used if a 24-hr specimen is not  available. The sensitivity of immunofixation for free light  chain is higher than that of electrophoresis. CPT Code: 18812  Electronically signed: Jennifer Chu MD      SPE/SHEKHAR Interpretation 01/26/2023 REVIEWED   Final    Comment: The serum protein electrophoresis is unremarkable. No monoclonal band is seen on serum protein electrophoresis. CPT: 13456  Electronically signed: Jennifer Chu MD     Orders Only on 01/26/2023   Component Date Value Ref Range Status    Cholesterol, Total 01/26/2023 127  0 - 199 mg/dL Final    Triglycerides 01/26/2023 121  0 - 150 mg/dL Final    HDL 01/26/2023 48  40 - 60 mg/dL Final    Comment: An HDL cholesterol less than 40 mg/dL is low and  constitutes a coronary heart disease risk factor. An HDL cholesterol greater than 60 mg/dL is a  negative risk factor for coronary heart disease. LDL Calculated 01/26/2023 55  <100 mg/dL Final    VLDL Cholesterol Calculated 01/26/2023 24  Not Established mg/dL Final    Hemoglobin A1C 01/26/2023 6.1  See comment % Final    Comment: Comment:  Diagnosis of Diabetes: > or = 6.5%  Increased risk of diabetes (Prediabetes): 5.7-6.4%  Glycemic Control: Nonpregnant Adults: <7.0%                    Pregnant: <6.0%        eAG 01/26/2023 128.4  mg/dL Final   Office Visit on 01/24/2023   Component Date Value Ref Range Status    FIT-DNA (Cologuard) 02/10/2023 Positive (A)  Negative Final    Comment:   POSITIVE TEST RESULT. A positive Cologuard result should be followed with a colonoscopy or visual examination of the colon. The normal value (reference range) for this assay is negative.     TEST DESCRIPTION: Composite algorithmic analysis of stool DNA-biomarkers with hemoglobin immunoassay. Quantitative values of individual biomarkers are not reportable and are not associated with individual biomarker result reference ranges. Cologuard is intended for colorectal cancer screening of adults of either sex, 39 years or older, who are at average-risk for colorectal cancer (CRC). Cologuard has been approved for use by the [place] FDA. The performance of Cologuard was established in a cross sectional study of average-risk adults aged 48-80. Cologuard performance in patients ages 39 to 52 years was estimated by sub-group analysis of near-age groups. Colonoscopies performed for a positive result may find as the most clinically significant lesion: colorectal cancer [4.0%], advanced adenoma                            (including sessile serrated polyps greater than or equal to 1cm diameter) [20%] or non- advanced adenoma [31%]; or no colorectal neoplasia [45%]. These estimates are derived from a prospective cross-sectional screening study of 10,000 individuals at average risk for colorectal cancer who were screened with both Cologuard and colonoscopy. (Waqas URIARTE et al, [facility] J Med 2014;370(14):8243-1913.) Cologuard may produce a false negative or false positive result (no colorectal cancer or precancerous polyp present at colonoscopy follow up). A negative Cologuard test result does not guarantee the absence of CRC or advanced adenoma (pre-cancer). The current Cologuard screening interval is every 3 years. ([address] [place] [address] Task Force). Cologuard performance data in a 10,000 patient pivotal study using colonoscopy as the reference method can be accessed at the following location: www.Results Scorecard. [employer]/results. Additional description of the Cologuard test process,                            warnings and precautions can be found at www.[employer]. com. Assessment and Plan:  1. Will plan for a colonoscopy with MAC anesthesia.   The patient was informed of the risks and benefits of the procedure. 2.  Positive Cologuard will order colonoscopy for colorectal cancer surveillance. 3.  Intermittent constipation most likely slow transit will order Miralax 17 gm and Colace 100 mg take twice daily as needed for treatment. Recommend a good bowel regimen with increase in fruit, fiber and fluids. Will order Golytely for colonoscopy bowel prep to ensure colon prep is adequate. 4.  Family history of colon cancer in her brother will order colonoscopy for colorectal cancer surveillance. 5.  Further recommendations for follow-up will be determined after the colonoscopy has been completed.

## 2023-04-25 ENCOUNTER — HOSPITAL ENCOUNTER (OUTPATIENT)
Dept: SLEEP CENTER | Age: 73
Discharge: HOME | End: 2023-04-25
Payer: MEDICARE

## 2023-04-25 VITALS — HEIGHT: 60 IN | WEIGHT: 225 LBS | BODY MASS INDEX: 44.17 KG/M2

## 2023-04-25 DIAGNOSIS — G47.33 OSA (OBSTRUCTIVE SLEEP APNEA): ICD-10-CM

## 2023-04-25 PROCEDURE — 95810 POLYSOM 6/> YRS 4/> PARAM: CPT

## 2023-04-25 ASSESSMENT — SLEEP AND FATIGUE QUESTIONNAIRES
HOW LIKELY ARE YOU TO NOD OFF OR FALL ASLEEP WHILE SITTING AND TALKING TO SOMEONE: 0
HOW LIKELY ARE YOU TO NOD OFF OR FALL ASLEEP WHILE SITTING AND READING: 3
HOW LIKELY ARE YOU TO NOD OFF OR FALL ASLEEP WHILE LYING DOWN TO REST IN THE AFTERNOON WHEN CIRCUMSTANCES PERMIT: 3
HOW LIKELY ARE YOU TO NOD OFF OR FALL ASLEEP WHILE SITTING INACTIVE IN A PUBLIC PLACE: 0
HOW LIKELY ARE YOU TO NOD OFF OR FALL ASLEEP WHILE SITTING QUIETLY AFTER LUNCH WITHOUT ALCOHOL: 2
HOW LIKELY ARE YOU TO NOD OFF OR FALL ASLEEP WHEN YOU ARE A PASSENGER IN A CAR FOR AN HOUR WITHOUT A BREAK: 1
ESS TOTAL SCORE: 12
HOW LIKELY ARE YOU TO NOD OFF OR FALL ASLEEP WHILE WATCHING TV: 3
HOW LIKELY ARE YOU TO NOD OFF OR FALL ASLEEP IN A CAR, WHILE STOPPED FOR A FEW MINUTES IN TRAFFIC: 0

## 2023-04-26 NOTE — PROGRESS NOTES
4/26/2023  sleep study  for Titus Proper  1950 is complete. Results are pending physician review.     Electronically signed by Nayan Bassett on 4/26/2023 at 5:03 AM

## 2023-05-03 ENCOUNTER — HOSPITAL ENCOUNTER (OUTPATIENT)
Dept: SLEEP CENTER | Age: 73
Discharge: HOME | End: 2023-05-03
Payer: MEDICARE

## 2023-05-03 DIAGNOSIS — E66.01 MORBID OBESITY WITH BMI OF 40.0-44.9, ADULT (HCC): ICD-10-CM

## 2023-05-03 DIAGNOSIS — G47.10 HYPERSOMNIA: ICD-10-CM

## 2023-05-03 DIAGNOSIS — G47.33 OSA (OBSTRUCTIVE SLEEP APNEA): Primary | ICD-10-CM

## 2023-05-03 PROCEDURE — 99214 OFFICE O/P EST MOD 30 MIN: CPT | Performed by: INTERNAL MEDICINE

## 2023-05-03 PROCEDURE — 9990000010 HC NO CHARGE VISIT

## 2023-05-03 ASSESSMENT — ENCOUNTER SYMPTOMS
ABDOMINAL DISTENTION: 0
SHORTNESS OF BREATH: 0
ABDOMINAL PAIN: 0
EYE DISCHARGE: 0
COUGH: 0
BACK PAIN: 0
EYE ITCHING: 0

## 2023-05-03 NOTE — PROGRESS NOTES
Jihan Chaudhary  1950  Referring Provider: Cindy Washington MD    Subjective:     Chief Complaint   Patient presents with    Sleep Apnea       HPI  Bryce Deutsch is a 71 y.o. female is doing a telephone follow up visit. She had a PSG done on 04/25/23 which showed that she has severe RAJ with an AHI 42.9 and desat to 72%. She has no loss of weight. She is still sleepy and tired during the day time. .    Current Outpatient Medications   Medication Sig Dispense Refill    Polyethylene Glycol 3350 (MIRALAX PO) Take by mouth      VITAMIN D PO Take by mouth      MYRBETRIQ 50 MG TB24 Take 50 mg by mouth daily      metoprolol tartrate (LOPRESSOR) 25 MG tablet Take 1 tablet by mouth 2 times daily 180 tablet 3    triamterene-hydroCHLOROthiazide (DYAZIDE) 37.5-25 MG per capsule Take 1 capsule by mouth daily 90 capsule 3    losartan (COZAAR) 25 MG tablet Take 1 tablet by mouth daily 90 tablet 3    amLODIPine (NORVASC) 10 MG tablet Take 1 tablet by mouth daily 90 tablet 3    rosuvastatin (CRESTOR) 5 MG tablet Take 1 tablet by mouth daily 90 tablet 3    acetaminophen (TYLENOL) 325 MG tablet Take 2 tablets by mouth as needed for Pain      Biotin 1000 MCG TABS Take by mouth as needed      Melatonin 5 MG CAPS Take by mouth as needed      solifenacin (VESICARE) 10 MG tablet Take 5 mg by mouth daily (Patient not taking: Reported on 5/3/2023)       No current facility-administered medications for this encounter. No Known Allergies    Past Medical History:   Diagnosis Date    H/O echocardiogram 09/11/2020    EF 55-60%, Mild MR, TR & LVH.     History of nuclear stress test 09/03/2020    EF 69%, ABN, Mild degree of lateral wall ischemia, Exercise induced frequent PVCs and ventricular couplets noted    Hx of exercise stress test 07/28/2021    Treadmill    Hyperlipidemia     Hypertension     Nocturia        Past Surgical History:   Procedure Laterality Date    CARPAL TUNNEL RELEASE Bilateral     FRACTURE SURGERY      Left ankle

## 2023-05-31 LAB
ALBUMIN: 4.4 G/DL (ref 3.5–5.2)
BILIRUBIN URINE: NEGATIVE MG/DL
BUN BLDV-MCNC: 27 MG/DL (ref 3–29)
BUN/CREAT BLD: 23 (ref 7–25)
CALCIUM SERPL-MCNC: 10.2 MG/DL (ref 8.5–10.5)
CHLORIDE BLD-SCNC: 104 MEQ/L (ref 96–110)
CLARITY: CLEAR
CO2: 28 MEQ/L (ref 19–32)
COLOR, URINE: YELLOW
CREAT SERPL-MCNC: 1.2 MG/DL (ref 0.5–1.2)
CREATININE URINE: 88.5 MG/DL
FASTING STATUS: ABNORMAL
GLOMERULAR FILTRATION RATE: 48 MLS/MIN/1.73M2
GLUCOSE BLD-MCNC: 88 MG/DL (ref 70–99)
GLUCOSE URINE: NEGATIVE MG/DL
KETONES, URINE: NEGATIVE MG/DL
LEUKOCYTE ESTERASE, URINE: NEGATIVE
MAGNESIUM: 2.3 MG/DL (ref 1.4–2.5)
NITRATE, UA: NEGATIVE
PH, URINE: 6.5 (ref 4.5–8)
PHOSPHORUS: 3.8 MG/DL (ref 2.1–4.3)
POTASSIUM SERPL-SCNC: 4.8 MEQ/L (ref 3.4–5.3)
PROT/CREAT RATIO, UR: 0.1 RATIO (ref 15–220)
SODIUM BLD-SCNC: 141 MEQ/L (ref 135–148)
SPECIFIC GRAVITY, URINE: 1.02 (ref 1–1.03)
TOTAL PROTEIN, URINE: 6 MG/DL
TOTAL PROTEIN, URINE: NEGATIVE MG/DL
URINE HGB: NEGATIVE MG/DL
UROBILINOGEN, URINE: <2 MG/DL (ref 0–2)

## 2023-06-09 PROBLEM — F41.9 ANXIETY: Status: ACTIVE | Noted: 2023-06-09

## 2023-06-21 ENCOUNTER — HOSPITAL ENCOUNTER (OUTPATIENT)
Dept: SLEEP CENTER | Age: 73
Discharge: HOME | End: 2023-06-21
Payer: MEDICARE

## 2023-06-21 DIAGNOSIS — G47.33 OSA (OBSTRUCTIVE SLEEP APNEA): ICD-10-CM

## 2023-06-21 PROCEDURE — 95811 POLYSOM 6/>YRS CPAP 4/> PARM: CPT

## 2023-06-22 NOTE — PROGRESS NOTES
6/22/2023  sleep study  for Jeffory Dandy  1950 is complete. Results are pending physician review.     Electronically signed by Iván Oshea RCP on 6/22/2023 at 7:01 AM

## 2023-07-11 ENCOUNTER — OFFICE VISIT (OUTPATIENT)
Dept: INTERNAL MEDICINE CLINIC | Age: 73
End: 2023-07-11
Payer: MEDICARE

## 2023-07-11 VITALS
BODY MASS INDEX: 45.55 KG/M2 | HEART RATE: 100 BPM | WEIGHT: 232 LBS | DIASTOLIC BLOOD PRESSURE: 74 MMHG | OXYGEN SATURATION: 97 % | SYSTOLIC BLOOD PRESSURE: 130 MMHG | HEIGHT: 60 IN

## 2023-07-11 DIAGNOSIS — Z00.00 ANNUAL PHYSICAL EXAM: Primary | ICD-10-CM

## 2023-07-11 DIAGNOSIS — I47.1 ATRIAL TACHYCARDIA (HCC): ICD-10-CM

## 2023-07-11 DIAGNOSIS — G47.33 OSA (OBSTRUCTIVE SLEEP APNEA): ICD-10-CM

## 2023-07-11 DIAGNOSIS — N18.32 STAGE 3B CHRONIC KIDNEY DISEASE (HCC): ICD-10-CM

## 2023-07-11 DIAGNOSIS — L03.116 CELLULITIS OF LEFT LOWER EXTREMITY: ICD-10-CM

## 2023-07-11 DIAGNOSIS — R35.0 URINARY FREQUENCY: ICD-10-CM

## 2023-07-11 DIAGNOSIS — I10 PRIMARY HYPERTENSION: ICD-10-CM

## 2023-07-11 PROCEDURE — 99204 OFFICE O/P NEW MOD 45 MIN: CPT

## 2023-07-11 PROCEDURE — 3078F DIAST BP <80 MM HG: CPT

## 2023-07-11 PROCEDURE — 3075F SYST BP GE 130 - 139MM HG: CPT

## 2023-07-11 PROCEDURE — 1123F ACP DISCUSS/DSCN MKR DOCD: CPT

## 2023-07-11 RX ORDER — DOXYCYCLINE HYCLATE 100 MG
100 TABLET ORAL 2 TIMES DAILY
Qty: 14 TABLET | Refills: 0 | Status: SHIPPED | OUTPATIENT
Start: 2023-07-11 | End: 2023-07-18

## 2023-07-11 SDOH — HEALTH STABILITY: PHYSICAL HEALTH: ON AVERAGE, HOW MANY DAYS PER WEEK DO YOU ENGAGE IN MODERATE TO STRENUOUS EXERCISE (LIKE A BRISK WALK)?: 1 DAY

## 2023-07-11 SDOH — HEALTH STABILITY: PHYSICAL HEALTH: ON AVERAGE, HOW MANY MINUTES DO YOU ENGAGE IN EXERCISE AT THIS LEVEL?: 40 MIN

## 2023-07-11 SDOH — ECONOMIC STABILITY: FOOD INSECURITY: WITHIN THE PAST 12 MONTHS, THE FOOD YOU BOUGHT JUST DIDN'T LAST AND YOU DIDN'T HAVE MONEY TO GET MORE.: NEVER TRUE

## 2023-07-11 SDOH — ECONOMIC STABILITY: FOOD INSECURITY: WITHIN THE PAST 12 MONTHS, YOU WORRIED THAT YOUR FOOD WOULD RUN OUT BEFORE YOU GOT MONEY TO BUY MORE.: NEVER TRUE

## 2023-07-11 SDOH — ECONOMIC STABILITY: INCOME INSECURITY: HOW HARD IS IT FOR YOU TO PAY FOR THE VERY BASICS LIKE FOOD, HOUSING, MEDICAL CARE, AND HEATING?: NOT HARD AT ALL

## 2023-07-11 SDOH — ECONOMIC STABILITY: HOUSING INSECURITY
IN THE LAST 12 MONTHS, WAS THERE A TIME WHEN YOU DID NOT HAVE A STEADY PLACE TO SLEEP OR SLEPT IN A SHELTER (INCLUDING NOW)?: NO

## 2023-07-11 ASSESSMENT — ENCOUNTER SYMPTOMS
SORE THROAT: 0
EYE REDNESS: 0
EYE DISCHARGE: 0
DIARRHEA: 0
WHEEZING: 0
RHINORRHEA: 0
SHORTNESS OF BREATH: 0
STRIDOR: 0
CHEST TIGHTNESS: 0
TROUBLE SWALLOWING: 0
EYE PAIN: 0
COLOR CHANGE: 0
NAUSEA: 0
ABDOMINAL PAIN: 0
CONSTIPATION: 0
CHOKING: 0
COUGH: 0
FACIAL SWELLING: 0
VOMITING: 0

## 2023-07-11 ASSESSMENT — VISUAL ACUITY: OU: 1

## 2023-07-18 ENCOUNTER — OFFICE VISIT (OUTPATIENT)
Dept: INTERNAL MEDICINE CLINIC | Age: 73
End: 2023-07-18
Payer: MEDICARE

## 2023-07-18 VITALS
BODY MASS INDEX: 45.7 KG/M2 | OXYGEN SATURATION: 96 % | SYSTOLIC BLOOD PRESSURE: 130 MMHG | DIASTOLIC BLOOD PRESSURE: 70 MMHG | WEIGHT: 234 LBS | HEART RATE: 60 BPM

## 2023-07-18 DIAGNOSIS — L03.116 CELLULITIS OF LEFT LOWER EXTREMITY: Primary | ICD-10-CM

## 2023-07-18 DIAGNOSIS — I83.893 SYMPTOMATIC VARICOSE VEINS OF BOTH LOWER EXTREMITIES: ICD-10-CM

## 2023-07-18 DIAGNOSIS — M79.89 LOCALIZED SWELLING OF LOWER EXTREMITY: ICD-10-CM

## 2023-07-18 PROCEDURE — 1123F ACP DISCUSS/DSCN MKR DOCD: CPT

## 2023-07-18 PROCEDURE — 99213 OFFICE O/P EST LOW 20 MIN: CPT

## 2023-07-18 PROCEDURE — 3078F DIAST BP <80 MM HG: CPT

## 2023-07-18 PROCEDURE — 3075F SYST BP GE 130 - 139MM HG: CPT

## 2023-07-18 RX ORDER — DOXYCYCLINE HYCLATE 100 MG
100 TABLET ORAL 2 TIMES DAILY
Qty: 14 TABLET | Refills: 0 | Status: SHIPPED | OUTPATIENT
Start: 2023-07-18 | End: 2023-07-25

## 2023-07-18 RX ORDER — FUROSEMIDE 20 MG/1
20 TABLET ORAL DAILY
Qty: 5 TABLET | Refills: 0 | Status: SHIPPED | OUTPATIENT
Start: 2023-07-18 | End: 2023-07-23

## 2023-07-18 ASSESSMENT — ENCOUNTER SYMPTOMS
ABDOMINAL PAIN: 0
COUGH: 0
VOMITING: 0
NAUSEA: 0
WHEEZING: 0
CHOKING: 0
FACIAL SWELLING: 0
EYE REDNESS: 0
EYE PAIN: 0
TROUBLE SWALLOWING: 0
SHORTNESS OF BREATH: 0
DIARRHEA: 0
SORE THROAT: 0
STRIDOR: 0
EYE DISCHARGE: 0
CONSTIPATION: 0
CHEST TIGHTNESS: 0
COLOR CHANGE: 0
RHINORRHEA: 0

## 2023-07-18 ASSESSMENT — VISUAL ACUITY: OU: 1

## 2023-07-18 NOTE — PROGRESS NOTES
Subjective:      Chief Complaint   Patient presents with    Cellulitis     Left leg, 1 week check      HPI:  Linda Sanchez is a 71 y.o. female who presents today for recheck of LLE cellulitis. Reports improvement but still having swelling to lower extremities and warmth. Tolerated ATBs without complaint. Past Medical History:   Diagnosis Date    Anxiety 4/21    H/O echocardiogram 09/11/2020    EF 55-60%, Mild MR, TR & LVH. History of nuclear stress test 09/03/2020    EF 69%, ABN, Mild degree of lateral wall ischemia, Exercise induced frequent PVCs and ventricular couplets noted    Hx of exercise stress test 07/28/2021    Treadmill    Hyperlipidemia     Hypertension     Nocturia     Obesity     Sleep apnea 5/23    Urinary incontinence       Past Surgical History:   Procedure Laterality Date    CARPAL TUNNEL RELEASE Bilateral     FRACTURE SURGERY      Left ankle     HERNIA REPAIR  1998    [facility] (Dr Ailyn Johnson)    TUBAL LIGATION       Social History     Tobacco Use    Smoking status: Never    Smokeless tobacco: Never   Substance Use Topics    Alcohol use: No     Comment: caffeine       Review of Systems   Constitutional:  Negative for activity change, appetite change, chills, diaphoresis, fatigue, fever and unexpected weight change. HENT:  Negative for congestion, facial swelling, rhinorrhea, sore throat and trouble swallowing. Eyes:  Negative for pain, discharge, redness and visual disturbance. Respiratory:  Negative for cough, choking, chest tightness, shortness of breath, wheezing and stridor. Cardiovascular:  Positive for leg swelling. Negative for chest pain and palpitations. Gastrointestinal:  Negative for abdominal pain, constipation, diarrhea, nausea and vomiting. Genitourinary:  Negative for difficulty urinating, dysuria, flank pain and hematuria. Musculoskeletal:  Negative for gait problem and myalgias. Skin:  Negative for color change and pallor.    Neurological:  Negative for

## 2023-08-01 PROBLEM — I83.892 SYMPTOMATIC VARICOSE VEINS, LEFT: Status: ACTIVE | Noted: 2023-08-01

## 2023-09-20 ENCOUNTER — HOSPITAL ENCOUNTER (OUTPATIENT)
Dept: SLEEP CENTER | Age: 73
Discharge: HOME | End: 2023-09-20
Payer: MEDICARE

## 2023-09-20 DIAGNOSIS — G47.33 OSA (OBSTRUCTIVE SLEEP APNEA): ICD-10-CM

## 2023-09-20 DIAGNOSIS — E66.01 MORBID OBESITY WITH BMI OF 40.0-44.9, ADULT (HCC): ICD-10-CM

## 2023-09-20 DIAGNOSIS — G47.10 HYPERSOMNIA: ICD-10-CM

## 2023-09-20 PROCEDURE — 99214 OFFICE O/P EST MOD 30 MIN: CPT | Performed by: INTERNAL MEDICINE

## 2023-09-20 PROCEDURE — 9990000010 HC NO CHARGE VISIT

## 2023-09-20 ASSESSMENT — SLEEP AND FATIGUE QUESTIONNAIRES
HOW LIKELY ARE YOU TO NOD OFF OR FALL ASLEEP WHEN YOU ARE A PASSENGER IN A CAR FOR AN HOUR WITHOUT A BREAK: 0
ESS TOTAL SCORE: 11
HOW LIKELY ARE YOU TO NOD OFF OR FALL ASLEEP WHILE SITTING AND TALKING TO SOMEONE: 0
HOW LIKELY ARE YOU TO NOD OFF OR FALL ASLEEP WHILE WATCHING TV: 3
HOW LIKELY ARE YOU TO NOD OFF OR FALL ASLEEP IN A CAR, WHILE STOPPED FOR A FEW MINUTES IN TRAFFIC: 0
HOW LIKELY ARE YOU TO NOD OFF OR FALL ASLEEP WHILE SITTING QUIETLY AFTER LUNCH WITHOUT ALCOHOL: 1
HOW LIKELY ARE YOU TO NOD OFF OR FALL ASLEEP WHILE LYING DOWN TO REST IN THE AFTERNOON WHEN CIRCUMSTANCES PERMIT: 3
HOW LIKELY ARE YOU TO NOD OFF OR FALL ASLEEP WHILE SITTING INACTIVE IN A PUBLIC PLACE: 1
HOW LIKELY ARE YOU TO NOD OFF OR FALL ASLEEP WHILE SITTING AND READING: 3

## 2023-09-20 ASSESSMENT — ENCOUNTER SYMPTOMS
COUGH: 0
ABDOMINAL DISTENTION: 0
ABDOMINAL PAIN: 0
EYE DISCHARGE: 0
EYE ITCHING: 0
SHORTNESS OF BREATH: 0
BACK PAIN: 0

## 2023-09-20 NOTE — PROGRESS NOTES
Selam Elisha  1950  Referring Provider: NICK Ace CNP    Subjective:     Chief Complaint   Patient presents with    1 Month Follow-Up    Sleep Apnea     G47.33       HPI  [place] is a 71 y.o. female has come back as a follow up. She has severe RAJ requiring a CPAP of 14 cm h20 whcih she is using it every night about 5 to 6 hours. She says that it is helping her. She has no loss of weight. She has a FFM. She is less sleepy during the day time. Her 2 week download data showed a residual AHI 1.3 and leak is 6.4 L/min    Current Outpatient Medications   Medication Sig Dispense Refill    Biotin 1000 MCG CHEW Take by mouth      CALCIUM PO Take by mouth 1 gummy a day      citalopram (CELEXA) 10 MG tablet Take 1 tablet by mouth daily 90 tablet 3    Polyethylene Glycol 3350 (MIRALAX PO) Take by mouth as needed      VITAMIN D PO Take by mouth      MYRBETRIQ 50 MG TB24 Take 50 mg by mouth daily      metoprolol tartrate (LOPRESSOR) 25 MG tablet Take 1 tablet by mouth 2 times daily 180 tablet 3    triamterene-hydroCHLOROthiazide (DYAZIDE) 37.5-25 MG per capsule Take 1 capsule by mouth daily 90 capsule 3    losartan (COZAAR) 25 MG tablet Take 1 tablet by mouth daily 90 tablet 3    amLODIPine (NORVASC) 10 MG tablet Take 1 tablet by mouth daily 90 tablet 3    rosuvastatin (CRESTOR) 5 MG tablet Take 1 tablet by mouth daily 90 tablet 3    acetaminophen (TYLENOL) 325 MG tablet Take 2 tablets by mouth as needed for Pain       No current facility-administered medications for this encounter. No Known Allergies    Past Medical History:   Diagnosis Date    Anxiety 4/21    H/O echocardiogram 09/11/2020    EF 55-60%, Mild MR, TR & LVH.     History of nuclear stress test 09/03/2020    EF 69%, ABN, Mild degree of lateral wall ischemia, Exercise induced frequent PVCs and ventricular couplets noted    Hx of exercise stress test 07/28/2021    Treadmill    Hyperlipidemia     Hypertension     Nocturia     Obesity     Sleep

## 2023-09-27 LAB
ABSOLUTE IMMATURE GRANULOCYTE: 0 K/UL (ref 0–0.1)
ALBUMIN: 4.5 G/DL (ref 3.5–5.2)
BASOPHILS ABSOLUTE: 0 K/UL (ref 0–0.3)
BASOPHILS RELATIVE PERCENT: 0.7 % (ref 0–2)
BILIRUBIN URINE: NEGATIVE MG/DL
BUN BLDV-MCNC: 26 MG/DL (ref 3–29)
BUN/CREAT BLD: 20 (ref 7–25)
CALCIUM SERPL-MCNC: 10.4 MG/DL (ref 8.5–10.5)
CHLORIDE BLD-SCNC: 106 MEQ/L (ref 96–110)
CLARITY: CLEAR
CO2: 25 MEQ/L (ref 19–32)
COLOR, URINE: YELLOW
CREAT SERPL-MCNC: 1.3 MG/DL (ref 0.5–1.2)
CREATININE URINE: 119.4 MG/DL
DIFFERENTIAL TYPE: NORMAL
EOSINOPHILS ABSOLUTE: 0.1 K/UL (ref 0–0.5)
EOSINOPHILS RELATIVE PERCENT: 2.2 % (ref 0–5)
FASTING STATUS: ABNORMAL
GLOMERULAR FILTRATION RATE: 44 MLS/MIN/1.73M2
GLUCOSE BLD-MCNC: 92 MG/DL (ref 70–99)
GLUCOSE URINE: NEGATIVE MG/DL
HCT VFR BLD CALC: 36.3 % (ref 34–49)
HEMOGLOBIN: 12.1 G/DL (ref 11.2–15.7)
IMMATURE GRANULOCYTES: 0.6 %
KETONES, URINE: NEGATIVE MG/DL
LEUKOCYTE ESTERASE, URINE: NEGATIVE
LYMPHOCYTES ABSOLUTE: 1.5 K/UL (ref 0.9–4.1)
LYMPHOCYTES RELATIVE PERCENT: 28.2 % (ref 14–51)
MAGNESIUM: 2.2 MG/DL (ref 1.4–2.5)
MCH RBC QN AUTO: 30.5 PG (ref 26–34)
MCHC RBC AUTO-ENTMCNC: 33.3 G/DL (ref 30.7–35.5)
MCV RBC AUTO: 91.4 FL (ref 80–100)
MONOCYTES ABSOLUTE: 0.5 K/UL (ref 0.2–1)
MONOCYTES RELATIVE PERCENT: 9.7 % (ref 4–12)
NEUTROPHILS ABSOLUTE: 3.1 K/UL (ref 1.8–7.5)
NEUTROPHILS RELATIVE PERCENT: 58.6 % (ref 42–80)
NITRATE, UA: NEGATIVE
NUCLEATED RBCS: 0 /100 WBC
PDW BLD-RTO: 13.3 %
PH, URINE: 6 (ref 4.5–8)
PHOSPHORUS: 3.3 MG/DL (ref 2.1–4.3)
PLATELET # BLD: 283 K/UL (ref 140–400)
PMV BLD AUTO: 10.5 FL (ref 7.2–11.7)
POTASSIUM SERPL-SCNC: 4.9 MEQ/L (ref 3.4–5.3)
PROT/CREAT RATIO, UR: 0.1 RATIO (ref 15–220)
RBC # BLD: 3.97 M/UL (ref 3.95–5.26)
SODIUM BLD-SCNC: 141 MEQ/L (ref 135–148)
SPECIFIC GRAVITY, URINE: 1.01 (ref 1–1.03)
TOTAL PROTEIN, URINE: 8 MG/DL
TOTAL PROTEIN, URINE: NEGATIVE MG/DL
URINE HGB: NEGATIVE MG/DL
UROBILINOGEN, URINE: <2 MG/DL (ref 0–2)
WBC: 5.4 K/UL (ref 3.5–10.9)

## 2023-10-02 ENCOUNTER — OFFICE VISIT (OUTPATIENT)
Dept: INTERNAL MEDICINE CLINIC | Age: 73
End: 2023-10-02
Payer: MEDICARE

## 2023-10-02 VITALS
BODY MASS INDEX: 45.7 KG/M2 | RESPIRATION RATE: 18 BRPM | HEART RATE: 61 BPM | DIASTOLIC BLOOD PRESSURE: 68 MMHG | WEIGHT: 234 LBS | SYSTOLIC BLOOD PRESSURE: 132 MMHG | OXYGEN SATURATION: 96 %

## 2023-10-02 DIAGNOSIS — R73.03 PREDIABETES: ICD-10-CM

## 2023-10-02 DIAGNOSIS — I83.892 SYMPTOMATIC VARICOSE VEINS, LEFT: ICD-10-CM

## 2023-10-02 DIAGNOSIS — E66.01 MORBID OBESITY WITH BMI OF 40.0-44.9, ADULT (HCC): ICD-10-CM

## 2023-10-02 DIAGNOSIS — I10 PRIMARY HYPERTENSION: Primary | ICD-10-CM

## 2023-10-02 DIAGNOSIS — I47.19 ATRIAL TACHYCARDIA: ICD-10-CM

## 2023-10-02 PROCEDURE — 3074F SYST BP LT 130 MM HG: CPT

## 2023-10-02 PROCEDURE — 99213 OFFICE O/P EST LOW 20 MIN: CPT

## 2023-10-02 PROCEDURE — 1123F ACP DISCUSS/DSCN MKR DOCD: CPT

## 2023-10-02 PROCEDURE — 3078F DIAST BP <80 MM HG: CPT

## 2023-10-02 RX ORDER — TRIAMTERENE AND HYDROCHLOROTHIAZIDE 37.5; 25 MG/1; MG/1
1 CAPSULE ORAL DAILY
Qty: 90 CAPSULE | Refills: 3 | Status: SHIPPED | OUTPATIENT
Start: 2023-10-02

## 2023-10-02 RX ORDER — ROSUVASTATIN CALCIUM 5 MG/1
5 TABLET, COATED ORAL DAILY
Qty: 90 TABLET | Refills: 3 | Status: SHIPPED | OUTPATIENT
Start: 2023-10-02

## 2023-10-02 RX ORDER — AMLODIPINE BESYLATE 10 MG/1
10 TABLET ORAL DAILY
Qty: 90 TABLET | Refills: 3 | Status: SHIPPED | OUTPATIENT
Start: 2023-10-02

## 2023-10-02 RX ORDER — LOSARTAN POTASSIUM 25 MG/1
25 TABLET ORAL DAILY
Qty: 90 TABLET | Refills: 3 | Status: SHIPPED | OUTPATIENT
Start: 2023-10-02

## 2023-10-02 ASSESSMENT — ENCOUNTER SYMPTOMS
DIARRHEA: 0
CONSTIPATION: 0
EYE PAIN: 0
COLOR CHANGE: 0
COUGH: 0
CHEST TIGHTNESS: 0
SHORTNESS OF BREATH: 0
SORE THROAT: 0
EYE REDNESS: 0
STRIDOR: 0
ABDOMINAL PAIN: 0
FACIAL SWELLING: 0
TROUBLE SWALLOWING: 0
VOMITING: 0
WHEEZING: 0
CHOKING: 0
RHINORRHEA: 0
NAUSEA: 0
EYE DISCHARGE: 0

## 2023-10-02 ASSESSMENT — VISUAL ACUITY: OU: 1

## 2023-11-14 DIAGNOSIS — Z12.31 BREAST CANCER SCREENING BY MAMMOGRAM: Primary | ICD-10-CM

## 2023-12-26 LAB
AVERAGE GLUCOSE: NORMAL
CHOLESTEROL, TOTAL: 120 MG/DL
CHOLESTEROL/HDL RATIO: NORMAL
HBA1C MFR BLD: 6.1 %
HDLC SERPL-MCNC: 50 MG/DL (ref 35–70)
LDL CHOLESTEROL CALCULATED: 49 MG/DL (ref 0–160)
NONHDLC SERPL-MCNC: NORMAL MG/DL
TRIGL SERPL-MCNC: 106 MG/DL
VLDLC SERPL CALC-MCNC: 21 MG/DL

## 2023-12-29 DIAGNOSIS — R73.03 PREDIABETES: ICD-10-CM

## 2023-12-29 DIAGNOSIS — I10 PRIMARY HYPERTENSION: ICD-10-CM

## 2024-01-02 ENCOUNTER — OFFICE VISIT (OUTPATIENT)
Dept: INTERNAL MEDICINE CLINIC | Age: 74
End: 2024-01-02
Payer: MEDICARE

## 2024-01-02 VITALS
HEART RATE: 76 BPM | OXYGEN SATURATION: 97 % | SYSTOLIC BLOOD PRESSURE: 118 MMHG | BODY MASS INDEX: 44.96 KG/M2 | WEIGHT: 229 LBS | DIASTOLIC BLOOD PRESSURE: 70 MMHG | HEIGHT: 60 IN

## 2024-01-02 DIAGNOSIS — R35.0 URINARY FREQUENCY: ICD-10-CM

## 2024-01-02 DIAGNOSIS — I10 PRIMARY HYPERTENSION: Primary | ICD-10-CM

## 2024-01-02 DIAGNOSIS — E66.01 MORBID OBESITY WITH BMI OF 40.0-44.9, ADULT (HCC): ICD-10-CM

## 2024-01-02 DIAGNOSIS — F41.9 ANXIETY: ICD-10-CM

## 2024-01-02 DIAGNOSIS — R07.9 CHEST PAIN, UNSPECIFIED TYPE: ICD-10-CM

## 2024-01-02 PROCEDURE — 3074F SYST BP LT 130 MM HG: CPT | Performed by: FAMILY MEDICINE

## 2024-01-02 PROCEDURE — 99214 OFFICE O/P EST MOD 30 MIN: CPT | Performed by: FAMILY MEDICINE

## 2024-01-02 PROCEDURE — 3078F DIAST BP <80 MM HG: CPT | Performed by: FAMILY MEDICINE

## 2024-01-02 PROCEDURE — 1123F ACP DISCUSS/DSCN MKR DOCD: CPT | Performed by: FAMILY MEDICINE

## 2024-01-02 RX ORDER — CITALOPRAM 20 MG/1
20 TABLET ORAL DAILY
Qty: 30 TABLET | Refills: 3 | Status: SHIPPED | OUTPATIENT
Start: 2024-01-02

## 2024-01-02 RX ORDER — MIRABEGRON 50 MG/1
50 TABLET, FILM COATED, EXTENDED RELEASE ORAL DAILY
Qty: 90 TABLET | Refills: 1 | Status: SHIPPED | OUTPATIENT
Start: 2024-01-02

## 2024-01-02 ASSESSMENT — ENCOUNTER SYMPTOMS
CHEST TIGHTNESS: 0
COLOR CHANGE: 0
SHORTNESS OF BREATH: 0
SORE THROAT: 0
DIARRHEA: 0
VOMITING: 0
ABDOMINAL PAIN: 0
CONSTIPATION: 0

## 2024-01-02 ASSESSMENT — PATIENT HEALTH QUESTIONNAIRE - PHQ9
SUM OF ALL RESPONSES TO PHQ QUESTIONS 1-9: 0
2. FEELING DOWN, DEPRESSED OR HOPELESS: 0
SUM OF ALL RESPONSES TO PHQ QUESTIONS 1-9: 0
SUM OF ALL RESPONSES TO PHQ QUESTIONS 1-9: 0
SUM OF ALL RESPONSES TO PHQ9 QUESTIONS 1 & 2: 0
SUM OF ALL RESPONSES TO PHQ QUESTIONS 1-9: 0
1. LITTLE INTEREST OR PLEASURE IN DOING THINGS: 0

## 2024-01-02 NOTE — PROGRESS NOTES
note reviewed.   Constitutional:       General: She is not in acute distress.     Appearance: Normal appearance. She is not ill-appearing or toxic-appearing.   HENT:      Head: Normocephalic and atraumatic.      Right Ear: External ear normal.      Left Ear: External ear normal.      Nose: Nose normal.      Mouth/Throat:      Pharynx: Oropharynx is clear.   Eyes:      General: No scleral icterus.        Right eye: No discharge.         Left eye: No discharge.      Extraocular Movements: Extraocular movements intact.      Conjunctiva/sclera: Conjunctivae normal.   Cardiovascular:      Rate and Rhythm: Normal rate and regular rhythm.      Heart sounds: Normal heart sounds.   Pulmonary:      Effort: Pulmonary effort is normal.      Breath sounds: Normal breath sounds. No wheezing or rales.   Musculoskeletal:         General: No deformity.      Cervical back: Normal range of motion and neck supple. No rigidity.   Skin:     General: Skin is warm and dry.      Findings: No rash.   Neurological:      General: No focal deficit present.      Mental Status: She is alert. Mental status is at baseline.      Motor: No weakness.   Psychiatric:         Mood and Affect: Mood normal.         Behavior: Behavior normal.            Assessment / Plan:      1. Primary hypertension  Stable, well controlled.  Continue current medications.     2. Morbid obesity with BMI of 40.0-44.9, adult (HCC)  Is receiving (presumably) GLP1 injections at [facility] with Dr. Harrington.  Will continue following.     3. Anxiety  No appreciable improvement with low dose celexa, will try increasing dose.    - citalopram (CELEXA) 20 MG tablet; Take 1 tablet by mouth daily  Dispense: 30 tablet; Refill: 3    4. Chest pain, unspecified type  One episode last week, has resolved with no recurrence.  Symptoms atypical; however, given risk factors and as patient is overdue for cardiology follow up, instructed to schedule appointment for further evaluation.  Voiced

## 2024-02-16 DIAGNOSIS — F41.9 ANXIETY: ICD-10-CM

## 2024-02-16 DIAGNOSIS — I47.19 ATRIAL TACHYCARDIA: ICD-10-CM

## 2024-02-16 DIAGNOSIS — I10 PRIMARY HYPERTENSION: ICD-10-CM

## 2024-02-16 DIAGNOSIS — R35.0 URINARY FREQUENCY: ICD-10-CM

## 2024-02-16 RX ORDER — AMLODIPINE BESYLATE 10 MG/1
10 TABLET ORAL DAILY
Qty: 90 TABLET | Refills: 0 | Status: SHIPPED | OUTPATIENT
Start: 2024-02-16

## 2024-02-16 RX ORDER — ROSUVASTATIN CALCIUM 5 MG/1
5 TABLET, COATED ORAL DAILY
Qty: 90 TABLET | Refills: 0 | Status: SHIPPED | OUTPATIENT
Start: 2024-02-16

## 2024-02-16 RX ORDER — LOSARTAN POTASSIUM 25 MG/1
25 TABLET ORAL DAILY
Qty: 90 TABLET | Refills: 0 | Status: SHIPPED | OUTPATIENT
Start: 2024-02-16

## 2024-02-16 RX ORDER — CITALOPRAM 20 MG/1
20 TABLET ORAL DAILY
Qty: 90 TABLET | Refills: 0 | Status: SHIPPED | OUTPATIENT
Start: 2024-02-16

## 2024-02-16 RX ORDER — TRIAMTERENE AND HYDROCHLOROTHIAZIDE 37.5; 25 MG/1; MG/1
1 CAPSULE ORAL DAILY
Qty: 90 CAPSULE | Refills: 0 | Status: SHIPPED | OUTPATIENT
Start: 2024-02-16

## 2024-04-04 ENCOUNTER — OFFICE VISIT (OUTPATIENT)
Dept: INTERNAL MEDICINE CLINIC | Age: 74
End: 2024-04-04

## 2024-04-04 VITALS
SYSTOLIC BLOOD PRESSURE: 130 MMHG | HEART RATE: 57 BPM | DIASTOLIC BLOOD PRESSURE: 68 MMHG | OXYGEN SATURATION: 96 % | WEIGHT: 227 LBS | BODY MASS INDEX: 44.33 KG/M2

## 2024-04-04 DIAGNOSIS — R73.03 PREDIABETES: ICD-10-CM

## 2024-04-04 DIAGNOSIS — G47.33 OSA (OBSTRUCTIVE SLEEP APNEA): ICD-10-CM

## 2024-04-04 DIAGNOSIS — Z53.20 COLONOSCOPY REFUSED: ICD-10-CM

## 2024-04-04 DIAGNOSIS — N18.32 STAGE 3B CHRONIC KIDNEY DISEASE (HCC): ICD-10-CM

## 2024-04-04 DIAGNOSIS — F41.9 ANXIETY: ICD-10-CM

## 2024-04-04 DIAGNOSIS — I10 PRIMARY HYPERTENSION: Primary | ICD-10-CM

## 2024-04-04 DIAGNOSIS — Z53.20 MAMMOGRAM DECLINED: ICD-10-CM

## 2024-04-04 DIAGNOSIS — Z91.81 AT HIGH RISK FOR FALLS: ICD-10-CM

## 2024-04-04 DIAGNOSIS — R35.0 URINARY FREQUENCY: ICD-10-CM

## 2024-04-04 RX ORDER — BUPROPION HYDROCHLORIDE 150 MG/1
150 TABLET ORAL EVERY MORNING
Qty: 30 TABLET | Refills: 0 | Status: SHIPPED | OUTPATIENT
Start: 2024-04-04

## 2024-04-04 NOTE — PATIENT INSTRUCTIONS
Thank you for letting me participate in your care today.    Take half tablet of Celexa x 1 week until

## 2024-04-04 NOTE — PROGRESS NOTES
awake. She is not in acute distress.     Appearance: Normal appearance. She is well-developed. She is not ill-appearing or toxic-appearing.   HENT:      Head: Normocephalic.      Jaw: There is normal jaw occlusion.      Right Ear: Tympanic membrane, ear canal and external ear normal.      Left Ear: Tympanic membrane, ear canal and external ear normal.      Nose: Nose normal.      Mouth/Throat:      Mouth: Mucous membranes are moist.      Pharynx: Oropharynx is clear.   Eyes:      General: Lids are normal. Vision grossly intact. Gaze aligned appropriately.      Extraocular Movements: Extraocular movements intact.      Conjunctiva/sclera: Conjunctivae normal.      Pupils: Pupils are equal, round, and reactive to light.   Cardiovascular:      Rate and Rhythm: Normal rate and regular rhythm.      Pulses: Normal pulses.      Heart sounds: Normal heart sounds, S1 normal and S2 normal.   Pulmonary:      Effort: Pulmonary effort is normal. No respiratory distress.      Breath sounds: Normal breath sounds.   Abdominal:      General: Bowel sounds are normal.      Palpations: Abdomen is soft.      Tenderness: There is no abdominal tenderness. There is no right CVA tenderness, left CVA tenderness, guarding or rebound.   Musculoskeletal:         General: Normal range of motion.      Cervical back: Normal range of motion.   Skin:     General: Skin is warm and dry.      Capillary Refill: Capillary refill takes less than 2 seconds.   Neurological:      General: No focal deficit present.      Mental Status: She is alert and oriented to person, place, and time. Mental status is at baseline.      GCS: GCS eye subscore is 4. GCS verbal subscore is 5. GCS motor subscore is 6.      Cranial Nerves: No cranial nerve deficit.      Sensory: Sensation is intact. No sensory deficit.      Motor: Motor function is intact.      Coordination: Coordination is intact.      Gait: Gait is intact.   Psychiatric:         Attention and Perception:

## 2024-04-05 ASSESSMENT — ENCOUNTER SYMPTOMS
DIARRHEA: 0
COLOR CHANGE: 0
ABDOMINAL PAIN: 0
EYE DISCHARGE: 0
STRIDOR: 0
FACIAL SWELLING: 0
COUGH: 0
CONSTIPATION: 0
VOMITING: 0
CHEST TIGHTNESS: 0
SORE THROAT: 0
NAUSEA: 0
SHORTNESS OF BREATH: 0
EYE PAIN: 0
CHOKING: 0
RHINORRHEA: 0
TROUBLE SWALLOWING: 0

## 2024-04-05 ASSESSMENT — VISUAL ACUITY: OU: 1

## 2024-04-26 ENCOUNTER — TELEPHONE (OUTPATIENT)
Dept: INTERNAL MEDICINE CLINIC | Age: 74
End: 2024-04-26

## 2024-04-26 NOTE — TELEPHONE ENCOUNTER
----- Message from NICK Ellis CNP sent at 4/26/2024  1:09 PM EDT -----  Regarding: FW: wellbutrin response  Please contact patient to inquire about response to Wellbutrin medication regimen. Thanks!  ----- Message -----  From: Julia Dai APRN - CNP  Sent: 4/25/2024  12:00 AM EDT  To: Background Patient List Reminders  Subject: wellbutrin response

## 2024-04-26 NOTE — TELEPHONE ENCOUNTER
I called and spoke with Pt. Pt states that her HR is staying in the 60s, a little bit of dizziness and some blurry vision. Pt states that its not every day and states she can't blame medication her eyes are bad but it got a little worse being on medication.

## 2024-05-03 DIAGNOSIS — F41.9 ANXIETY: ICD-10-CM

## 2024-05-03 RX ORDER — BUPROPION HYDROCHLORIDE 150 MG/1
150 TABLET ORAL EVERY MORNING
Qty: 90 TABLET | Refills: 0 | Status: SHIPPED | OUTPATIENT
Start: 2024-05-03

## 2024-05-09 ENCOUNTER — TELEPHONE (OUTPATIENT)
Dept: INTERNAL MEDICINE CLINIC | Age: 74
End: 2024-05-09

## 2024-05-09 NOTE — TELEPHONE ENCOUNTER
Patient called stating that she does not want to take wellbutrin because she does not like the way it makes her feel. Patient feels dizzy and is going to ween herself off. TORRI

## 2024-05-13 NOTE — TELEPHONE ENCOUNTER
I called and talked with Pt and she stated that she would like to ween of this medication first and then she will call if she wants to go on something else. No further action needed

## 2024-06-07 DIAGNOSIS — I47.19 ATRIAL TACHYCARDIA (HCC): ICD-10-CM

## 2024-06-07 DIAGNOSIS — I10 PRIMARY HYPERTENSION: ICD-10-CM

## 2024-06-07 RX ORDER — TRIAMTERENE AND HYDROCHLOROTHIAZIDE 37.5; 25 MG/1; MG/1
1 CAPSULE ORAL DAILY
Qty: 90 CAPSULE | Refills: 0 | Status: SHIPPED | OUTPATIENT
Start: 2024-06-07

## 2024-06-07 RX ORDER — AMLODIPINE BESYLATE 10 MG/1
10 TABLET ORAL DAILY
Qty: 90 TABLET | Refills: 0 | Status: SHIPPED | OUTPATIENT
Start: 2024-06-07

## 2024-06-07 RX ORDER — LOSARTAN POTASSIUM 25 MG/1
25 TABLET ORAL DAILY
Qty: 90 TABLET | Refills: 0 | Status: SHIPPED | OUTPATIENT
Start: 2024-06-07

## 2024-06-07 RX ORDER — ROSUVASTATIN CALCIUM 5 MG/1
5 TABLET, COATED ORAL DAILY
Qty: 90 TABLET | Refills: 0 | Status: SHIPPED | OUTPATIENT
Start: 2024-06-07

## 2024-06-10 ENCOUNTER — OFFICE VISIT (OUTPATIENT)
Dept: INTERNAL MEDICINE CLINIC | Age: 74
End: 2024-06-10
Payer: MEDICARE

## 2024-06-10 VITALS
HEART RATE: 58 BPM | OXYGEN SATURATION: 95 % | WEIGHT: 235 LBS | DIASTOLIC BLOOD PRESSURE: 66 MMHG | BODY MASS INDEX: 45.9 KG/M2 | SYSTOLIC BLOOD PRESSURE: 122 MMHG

## 2024-06-10 DIAGNOSIS — G89.29 CHRONIC PAIN OF LEFT KNEE: ICD-10-CM

## 2024-06-10 DIAGNOSIS — E66.01 MORBID OBESITY WITH BMI OF 40.0-44.9, ADULT (HCC): ICD-10-CM

## 2024-06-10 DIAGNOSIS — M25.552 CHRONIC LEFT HIP PAIN: ICD-10-CM

## 2024-06-10 DIAGNOSIS — I10 PRIMARY HYPERTENSION: ICD-10-CM

## 2024-06-10 DIAGNOSIS — F41.9 ANXIETY: Primary | ICD-10-CM

## 2024-06-10 DIAGNOSIS — G89.29 CHRONIC LEFT HIP PAIN: ICD-10-CM

## 2024-06-10 DIAGNOSIS — R35.0 URINARY FREQUENCY: ICD-10-CM

## 2024-06-10 DIAGNOSIS — I83.892 SYMPTOMATIC VARICOSE VEINS, LEFT: ICD-10-CM

## 2024-06-10 DIAGNOSIS — M25.562 CHRONIC PAIN OF LEFT KNEE: ICD-10-CM

## 2024-06-10 PROCEDURE — G8427 DOCREV CUR MEDS BY ELIG CLIN: HCPCS

## 2024-06-10 PROCEDURE — 1090F PRES/ABSN URINE INCON ASSESS: CPT

## 2024-06-10 PROCEDURE — 3074F SYST BP LT 130 MM HG: CPT

## 2024-06-10 PROCEDURE — 1036F TOBACCO NON-USER: CPT

## 2024-06-10 PROCEDURE — 3078F DIAST BP <80 MM HG: CPT

## 2024-06-10 PROCEDURE — 3017F COLORECTAL CA SCREEN DOC REV: CPT

## 2024-06-10 PROCEDURE — G8399 PT W/DXA RESULTS DOCUMENT: HCPCS

## 2024-06-10 PROCEDURE — 1123F ACP DISCUSS/DSCN MKR DOCD: CPT

## 2024-06-10 PROCEDURE — 99213 OFFICE O/P EST LOW 20 MIN: CPT

## 2024-06-10 PROCEDURE — G8417 CALC BMI ABV UP PARAM F/U: HCPCS

## 2024-06-10 RX ORDER — DULOXETIN HYDROCHLORIDE 30 MG/1
30 CAPSULE, DELAYED RELEASE ORAL DAILY
Qty: 30 CAPSULE | Refills: 0 | Status: SHIPPED | OUTPATIENT
Start: 2024-06-10

## 2024-06-10 ASSESSMENT — ENCOUNTER SYMPTOMS
EYE REDNESS: 0
WHEEZING: 0
DIARRHEA: 0
EYE PAIN: 0
SHORTNESS OF BREATH: 0
NAUSEA: 0
TROUBLE SWALLOWING: 0
COUGH: 0
CHEST TIGHTNESS: 0
CONSTIPATION: 0
FACIAL SWELLING: 0
SORE THROAT: 0
RHINORRHEA: 0
EYE DISCHARGE: 0
STRIDOR: 0
VOMITING: 0
COLOR CHANGE: 0
CHOKING: 0
ABDOMINAL PAIN: 0

## 2024-06-10 ASSESSMENT — VISUAL ACUITY: OU: 1

## 2024-06-10 NOTE — PROGRESS NOTES
2 tablets by mouth as needed for Pain Yes Provider, MD Charla        Objective:      /66   Pulse 58   Wt 106.6 kg (235 lb)   SpO2 95%   BMI 45.90 kg/m²    Physical Exam  Vitals reviewed.   Constitutional:       General: She is awake. She is not in acute distress.     Appearance: Normal appearance. She is well-developed. She is not ill-appearing or toxic-appearing.   HENT:      Head: Normocephalic.      Jaw: There is normal jaw occlusion.      Right Ear: Tympanic membrane, ear canal and external ear normal.      Left Ear: Tympanic membrane, ear canal and external ear normal.      Nose: Nose normal.      Mouth/Throat:      Mouth: Mucous membranes are moist.      Pharynx: Oropharynx is clear.   Eyes:      General: Lids are normal. Vision grossly intact. Gaze aligned appropriately.      Extraocular Movements: Extraocular movements intact.      Conjunctiva/sclera: Conjunctivae normal.      Pupils: Pupils are equal, round, and reactive to light.   Cardiovascular:      Rate and Rhythm: Normal rate and regular rhythm.      Pulses: Normal pulses.      Heart sounds: Normal heart sounds, S1 normal and S2 normal.   Pulmonary:      Effort: Pulmonary effort is normal. No respiratory distress.      Breath sounds: Normal breath sounds.   Abdominal:      General: Bowel sounds are normal.      Palpations: Abdomen is soft.      Tenderness: There is no abdominal tenderness. There is no right CVA tenderness, left CVA tenderness, guarding or rebound.   Musculoskeletal:         General: Normal range of motion.      Cervical back: Normal range of motion.   Skin:     General: Skin is warm and dry.      Capillary Refill: Capillary refill takes less than 2 seconds.   Neurological:      General: No focal deficit present.      Mental Status: She is alert and oriented to person, place, and time. Mental status is at baseline.      GCS: GCS eye subscore is 4. GCS verbal subscore is 5. GCS motor subscore is 6.      Cranial Nerves: No

## 2024-07-02 DIAGNOSIS — F41.9 ANXIETY: ICD-10-CM

## 2024-07-02 RX ORDER — DULOXETIN HYDROCHLORIDE 30 MG/1
CAPSULE, DELAYED RELEASE ORAL DAILY
Qty: 90 CAPSULE | Refills: 1 | Status: SHIPPED | OUTPATIENT
Start: 2024-07-02

## 2024-07-08 SDOH — HEALTH STABILITY: PHYSICAL HEALTH: ON AVERAGE, HOW MANY DAYS PER WEEK DO YOU ENGAGE IN MODERATE TO STRENUOUS EXERCISE (LIKE A BRISK WALK)?: 0 DAYS

## 2024-07-08 ASSESSMENT — PATIENT HEALTH QUESTIONNAIRE - PHQ9
2. FEELING DOWN, DEPRESSED OR HOPELESS: NOT AT ALL
SUM OF ALL RESPONSES TO PHQ QUESTIONS 1-9: 0
SUM OF ALL RESPONSES TO PHQ9 QUESTIONS 1 & 2: 0
SUM OF ALL RESPONSES TO PHQ QUESTIONS 1-9: 0
1. LITTLE INTEREST OR PLEASURE IN DOING THINGS: NOT AT ALL

## 2024-07-08 ASSESSMENT — LIFESTYLE VARIABLES
HOW MANY STANDARD DRINKS CONTAINING ALCOHOL DO YOU HAVE ON A TYPICAL DAY: 0
HOW MANY STANDARD DRINKS CONTAINING ALCOHOL DO YOU HAVE ON A TYPICAL DAY: PATIENT DOES NOT DRINK
HOW OFTEN DO YOU HAVE SIX OR MORE DRINKS ON ONE OCCASION: 1
HOW OFTEN DO YOU HAVE A DRINK CONTAINING ALCOHOL: 1
HOW OFTEN DO YOU HAVE A DRINK CONTAINING ALCOHOL: NEVER

## 2024-07-11 ENCOUNTER — OFFICE VISIT (OUTPATIENT)
Dept: INTERNAL MEDICINE CLINIC | Age: 74
End: 2024-07-11
Payer: MEDICARE

## 2024-07-11 VITALS
DIASTOLIC BLOOD PRESSURE: 74 MMHG | HEART RATE: 76 BPM | HEIGHT: 60 IN | BODY MASS INDEX: 45.94 KG/M2 | WEIGHT: 234 LBS | OXYGEN SATURATION: 98 % | SYSTOLIC BLOOD PRESSURE: 132 MMHG

## 2024-07-11 DIAGNOSIS — M25.562 CHRONIC PAIN OF LEFT KNEE: ICD-10-CM

## 2024-07-11 DIAGNOSIS — G89.29 CHRONIC PAIN OF LEFT KNEE: ICD-10-CM

## 2024-07-11 DIAGNOSIS — Z00.00 MEDICARE ANNUAL WELLNESS VISIT, SUBSEQUENT: Primary | ICD-10-CM

## 2024-07-11 DIAGNOSIS — F41.9 ANXIETY: ICD-10-CM

## 2024-07-11 PROCEDURE — 1123F ACP DISCUSS/DSCN MKR DOCD: CPT

## 2024-07-11 PROCEDURE — 3075F SYST BP GE 130 - 139MM HG: CPT

## 2024-07-11 PROCEDURE — 3017F COLORECTAL CA SCREEN DOC REV: CPT

## 2024-07-11 PROCEDURE — 3078F DIAST BP <80 MM HG: CPT

## 2024-07-11 PROCEDURE — G0439 PPPS, SUBSEQ VISIT: HCPCS

## 2024-07-11 RX ORDER — DULOXETIN HYDROCHLORIDE 30 MG/1
30 CAPSULE, DELAYED RELEASE ORAL DAILY
Qty: 90 CAPSULE | Refills: 1 | Status: SHIPPED | OUTPATIENT
Start: 2024-07-11

## 2024-07-11 SDOH — ECONOMIC STABILITY: FOOD INSECURITY: WITHIN THE PAST 12 MONTHS, THE FOOD YOU BOUGHT JUST DIDN'T LAST AND YOU DIDN'T HAVE MONEY TO GET MORE.: NEVER TRUE

## 2024-07-11 SDOH — ECONOMIC STABILITY: FOOD INSECURITY: WITHIN THE PAST 12 MONTHS, YOU WORRIED THAT YOUR FOOD WOULD RUN OUT BEFORE YOU GOT MONEY TO BUY MORE.: NEVER TRUE

## 2024-07-11 SDOH — ECONOMIC STABILITY: INCOME INSECURITY: HOW HARD IS IT FOR YOU TO PAY FOR THE VERY BASICS LIKE FOOD, HOUSING, MEDICAL CARE, AND HEATING?: NOT HARD AT ALL

## 2024-07-11 NOTE — PATIENT INSTRUCTIONS
glaucoma; cataracts, macular degeneration, and other eye disorders.  A preventive dental visit is recommended every 6 months.  Try to get at least 150 minutes of exercise per week or 10,000 steps per day on a pedometer .  Order or download the FREE \"Exercise & Physical Activity: Your Everyday Guide\" from [employer] [place] [employer]. Call 1-771.445.8460 or search [employer] [place] [employer] online.  You need 3140-7895 mg of calcium and 3883-8768 IU of vitamin D per day. It is possible to meet your calcium requirement with diet alone, but a vitamin D supplement is usually necessary to meet this goal.  When exposed to the sun, use a sunscreen that protects against both UVA and UVB radiation with an SPF of 30 or greater. Reapply every 2 to 3 hours or after sweating, drying off with a towel, or swimming.  Always wear a seat belt when traveling in a car. Always wear a helmet when riding a bicycle or motorcycle.

## 2024-07-11 NOTE — PROGRESS NOTES
Medicare Annual Wellness Visit    Brittany Negrete is here for Medicare AWV and 3 Month Follow-Up    Assessment & Plan   Medicare annual wellness visit, subsequent  Anxiety  -     DULoxetine (CYMBALTA) 30 MG extended release capsule; Take 1 capsule by mouth daily, Disp-90 capsule, R-1Normal  Chronic pain of left knee  -     Rosas Almazan MD, Orthopedic Surgery, [place]    Recommendations for Preventive Services Due: see orders and patient instructions/AVS.  Recommended screening schedule for the next 5-10 years is provided to the patient in written form: see Patient Instructions/AVS.     Return in about 6 months (around 1/11/2025).     Subjective   The following acute and/or chronic problems were also addressed today:  Weight management 4 month wait to be seen    Blood pressure in good range today, seeing nephrology in August for follow up. Has labs prior to seeing them.     Anxiety well controlled on Cymbalta, needs refill today.     Left knee is becoming much more painful lately, ready to see orthopedics    CPAP is helping with good sleep quality. Has f/u with pulmonology in September.     Patient's complete Health Risk Assessment and screening values have been reviewed and are found in Flowsheets. The following problems were reviewed today and where indicated follow up appointments were made and/or referrals ordered.    Positive Risk Factor Screenings with Interventions:    Fall Risk:  Do you feel unsteady or are you worried about falling? : no  2 or more falls in past year?: (!) yes  Fall with injury in past year?: no     Interventions:    Reviewed medications, home hazards, visual acuity, and co-morbidities that can increase risk for falls  Patient declines any further evaluation or treatment             Inactivity:  On average, how many days per week do you engage in moderate to strenuous exercise (like a brisk walk)?: 0 days (!) Abnormal     Interventions - Inactivity:  Patient advised to follow

## 2024-07-17 SDOH — HEALTH STABILITY: PHYSICAL HEALTH: ON AVERAGE, HOW MANY DAYS PER WEEK DO YOU ENGAGE IN MODERATE TO STRENUOUS EXERCISE (LIKE A BRISK WALK)?: 0 DAYS

## 2024-07-18 ENCOUNTER — OFFICE VISIT (OUTPATIENT)
Dept: ORTHOPEDIC SURGERY | Age: 74
End: 2024-07-18
Payer: MEDICARE

## 2024-07-18 VITALS — HEART RATE: 74 BPM | OXYGEN SATURATION: 95 %

## 2024-07-18 DIAGNOSIS — M17.12 ARTHRITIS OF LEFT KNEE: Primary | ICD-10-CM

## 2024-07-18 PROCEDURE — 1123F ACP DISCUSS/DSCN MKR DOCD: CPT

## 2024-07-18 PROCEDURE — G8427 DOCREV CUR MEDS BY ELIG CLIN: HCPCS

## 2024-07-18 PROCEDURE — G8399 PT W/DXA RESULTS DOCUMENT: HCPCS

## 2024-07-18 PROCEDURE — 1036F TOBACCO NON-USER: CPT

## 2024-07-18 PROCEDURE — 3017F COLORECTAL CA SCREEN DOC REV: CPT

## 2024-07-18 PROCEDURE — 20610 DRAIN/INJ JOINT/BURSA W/O US: CPT

## 2024-07-18 PROCEDURE — 99203 OFFICE O/P NEW LOW 30 MIN: CPT

## 2024-07-18 PROCEDURE — G8417 CALC BMI ABV UP PARAM F/U: HCPCS

## 2024-07-18 PROCEDURE — 1090F PRES/ABSN URINE INCON ASSESS: CPT

## 2024-07-18 RX ORDER — TRIAMCINOLONE ACETONIDE 40 MG/ML
80 INJECTION, SUSPENSION INTRA-ARTICULAR; INTRAMUSCULAR ONCE
Status: COMPLETED | OUTPATIENT
Start: 2024-07-18 | End: 2024-07-18

## 2024-07-18 RX ADMIN — TRIAMCINOLONE ACETONIDE 80 MG: 40 INJECTION, SUSPENSION INTRA-ARTICULAR; INTRAMUSCULAR at 15:44

## 2024-07-18 ASSESSMENT — ENCOUNTER SYMPTOMS
BACK PAIN: 0
RHINORRHEA: 0
SHORTNESS OF BREATH: 0
NAUSEA: 0
FACIAL SWELLING: 0
COUGH: 0

## 2024-07-18 NOTE — PROGRESS NOTES
7/18/2024   Chief Complaint   Patient presents with    Pain     Left knee pain        History of Present Illness:                             Brittany Negrete is a 73 y.o. female presenting to the office today for management of left knee pain.  Patient states she has seen Dr. Angelo for this issue in the past and received different types of injections.  She states her knee pain is located in the anterior and medial aspect of the knee.  She states the pain is dull and achy and worse with positional changes.  She denies any acute or traumatic injury that would easily explain her symptoms.    Patient seen in office today for: left knee pain     Date of last injection: march 2024, has also had gel only lasts about 1 1/2  months     Patient reports 4/10 pain.pain is located directly in the front of the knee cap. Also recently pain in the left hip it is a burning sensation.  RICE and medication are not effective to alleviate pain and reduce swelling.      Pain worsened by: Patient reports painful ROM & weight bearing.      Xrays performed in office today.         Medical History  Patient's medications, allergies, past medical, surgical, social and family histories were reviewed and updated as appropriate.    Past Medical History:   Diagnosis Date    Anxiety 4/21    H/O echocardiogram 09/11/2020    EF 55-60%, Mild MR, TR & LVH.    History of nuclear stress test 09/03/2020    EF 69%, ABN, Mild degree of lateral wall ischemia, Exercise induced frequent PVCs and ventricular couplets noted    Hx of exercise stress test 07/28/2021    Treadmill    Hyperlipidemia     Hypertension     Nocturia     Obesity     Sleep apnea 5/23    Urinary incontinence      Past Surgical History:   Procedure Laterality Date    CARPAL TUNNEL RELEASE Bilateral     FRACTURE SURGERY      Left ankle     HERNIA REPAIR  1998    [facility] (Dr Fierro)    TUBAL LIGATION       Family History   Problem Relation Age of Onset    Hypertension Mother

## 2024-07-18 NOTE — PATIENT INSTRUCTIONS
Continue weight-bearing as tolerated.  Continue range of motion exercises as instructed.  Ice and elevate as needed.  Tylenol or Motrin for pain.  Follow up in 6 weeks  Out patient Physical Therapy has been ordered by your provider. [facility] will call you to set up therapy. If you have not heard from them within 24-48 hours of today's appointment, please reach out to them at [phone].

## 2024-07-18 NOTE — PROGRESS NOTES
Patient seen in office today for: left knee pain    Date of last injection: march 2024, has also had gel only lasts about 1 1/2  months    Patient reports 4/10 pain.pain is located directly in the front of the knee cap. Also recently pain in the left hip it is a burning sensation.  RICE and medication are not effective to alleviate pain and reduce swelling.     Pain worsened by: Patient reports painful ROM & weight bearing.     Xrays performed in office today.

## 2024-07-29 ENCOUNTER — HOSPITAL ENCOUNTER (OUTPATIENT)
Dept: PHYSICAL THERAPY | Age: 74
Setting detail: THERAPIES SERIES
Discharge: HOME | End: 2024-07-29
Payer: MEDICARE

## 2024-07-29 PROCEDURE — 97161 PT EVAL LOW COMPLEX 20 MIN: CPT

## 2024-07-29 PROCEDURE — 97110 THERAPEUTIC EXERCISES: CPT

## 2024-07-29 ASSESSMENT — PAIN DESCRIPTION - LOCATION: LOCATION: KNEE

## 2024-07-29 ASSESSMENT — PAIN DESCRIPTION - PAIN TYPE: TYPE: CHRONIC PAIN

## 2024-07-29 ASSESSMENT — PAIN DESCRIPTION - DESCRIPTORS: DESCRIPTORS: ACHING;SORE

## 2024-07-29 ASSESSMENT — PAIN SCALES - GENERAL: PAINLEVEL_OUTOF10: 0

## 2024-07-29 NOTE — PRE-CERTIFICATION NOTE
PT APPROVED FOR 10 VISITS INCLUDES DAY OF EVAL    [id number]    7/29/24-9/30/24    [facility]# [id number]

## 2024-07-29 NOTE — FLOWSHEET NOTE
Activity     [] Ultrasound  [] Estim  [x] Gait Training      [] Cervical Traction [] Lumbar Traction  [x] Neuromuscular Re-education    [] Cold/hotpack [] Iontophoresis   [x] Instruction in HEP      [x] Vasopneumatic   [] Dry Needling    [x] Manual Therapy               [] Aquatic Therapy              Electronically signed by:  Brooke Cuevas PT, DPT 7/29/2024, 8:44 AM

## 2024-07-29 NOTE — PROGRESS NOTES
pain. Goal would be to develop a strengthening home program to improve tolerance to ADL's. Pt will benefit with PT services with progression of strength/ROM, manual and modalities to return to PLOF.     Body Structures, Functions, Activity Limitations Requiring Skilled Therapeutic Intervention: Decreased functional mobility , Decreased ADL status, Decreased endurance, Increased pain, Decreased posture, Decreased ROM, Decreased body mechanics, Decreased tolerance to work activity, Decreased strength    Statement of Medical Necessity: Physical Therapy is both indicated and medically necessary as outlined in the POC to increase the likelihood of meeting the functionally related goals stated below.     Patient's Activity Tolerance: Patient tolerated evaluation without incident        Patient's rehabilitation potential/prognosis is considered to be: Good    Factors which may impact rehabilitation potential include: None  Measures taken to address barrier(s): N/A     GOALS     Patient Goal(s): improve L knee pain  Short Term Goals Completed by 5 weeks Goal Status   Pt dmeo I w/ HEP and symptom management New   Pt demo KOOS score <35% to improve ADL tolerance New   Pt demo >4/5 MMT without increased pain New   Pt demo > 10 sit to stands in 30 seconds without increased pain New   Pt demo ability to ascend/descend 1 flight of stairs with non-reciprocal pattern and x1 handrail without increased pain New                                        TREATMENT PLAN       Requires PT Follow-Up: Yes    Pt. actively involved in establishing Plan of Care and Goals: Yes  Patient/ Caregiver education and instruction:Goals, Plan of Care, Evaluative findings, PT Role, Home Exercise Program, Injury Prevention, Fall prevention strategies, Disease Specific Education, Body mechanics, Anatomy of condition             Treatment may include any combination of the following: Current Treatment Recommendations: Strengthening, Gait training, Stair

## 2024-07-29 NOTE — PLAN OF CARE
Outpatient Physical Therapy           [place]           [] Phone: [phone]   Fax: [phone]  [place]           [] Phone: [phone]   Fax: [phone]     To: Silvino Godinez PA-C Greg Mann   From: Brooke Cuevas PT, PT     Patient: Brittany Negrete       : 1950  Diagnosis: Arthritis of left knee [M17.12] Diagnosis: L knee OA  Treatment Diagnosis: Decreased L knee strength and ROM limited by pain  Date: 2024    Physical Therapy Certification/Re-Certification Form  Dear Brent Godinez,   The following patient has been evaluated for physical therapy services and for therapy to continue, insurance requires physician review of the treatment plan initially and every 90 days. Please review the attached evaluation and/or summary of the patient's plan of care, and verify that you agree therapy should continue by signing the attached document and sending it back to our office.    Assessment:    Assessment: Pt is 73 year old female with chronic L knee pain for 12+ years without injury. She received a cortisone injection last week from Brent Godinez PA-C and had previous injections with success for approx. 1.5 months following. She has the most trouble with stair management and bending her knee without significant pain. She is doing better following her injection last week and is able to complete more functional activities without increased pain. Goal would be to develop a strengthening home program to improve tolerance to ADL's. Pt will benefit with PT services with progression of strength/ROM, manual and modalities to return to PLOF.    Plan of Care/Treatment to date:  [x] Therapeutic Exercise  [] Modalities:  [x] Therapeutic Activity     [] Ultrasound  [] Electrical Stimulation  [x] Gait Training      [] Cervical Traction [] Lumbar Traction  [x] Neuromuscular Re-education    [] Cold/hotpack [] Iontophoresis   [x] Instruction in HEP      [x] Vasopneumatic    [] Dry Needling  [x] Manual Therapy

## 2024-07-30 LAB
BASOPHILS ABSOLUTE: 0 /ΜL
BASOPHILS RELATIVE PERCENT: 0.2 %
CHOLESTEROL, TOTAL: 167 MG/DL
CHOLESTEROL/HDL RATIO: NORMAL
EOSINOPHILS ABSOLUTE: 0.1 /ΜL
EOSINOPHILS RELATIVE PERCENT: 1.1 %
ESTIMATED AVERAGE GLUCOSE: NORMAL
HBA1C MFR BLD: 6.3 %
HCT VFR BLD CALC: 38.2 % (ref 36–46)
HDLC SERPL-MCNC: 51 MG/DL (ref 35–70)
HEMOGLOBIN: 12.6 G/DL (ref 12–16)
LDL CHOLESTEROL: 86
LYMPHOCYTES ABSOLUTE: 1.9 /ΜL
LYMPHOCYTES RELATIVE PERCENT: 21.3 %
MCH RBC QN AUTO: 30.2 PG
MCHC RBC AUTO-ENTMCNC: 33 G/DL
MCV RBC AUTO: 91.6 FL
MONOCYTES ABSOLUTE: 0.7 /ΜL
MONOCYTES RELATIVE PERCENT: 7.6 %
NEUTROPHILS ABSOLUTE: 6.2 /ΜL
NEUTROPHILS RELATIVE PERCENT: 69 %
NONHDLC SERPL-MCNC: NORMAL MG/DL
PDW BLD-RTO: 13 %
PLATELET # BLD: 326 K/ΜL
PMV BLD AUTO: 10.2 FL
RBC # BLD: 4.17 10^6/ΜL
TRIGL SERPL-MCNC: 149 MG/DL
VLDLC SERPL CALC-MCNC: 30 MG/DL
WBC # BLD: 9 10^3/ML

## 2024-07-31 ENCOUNTER — HOSPITAL ENCOUNTER (OUTPATIENT)
Dept: PHYSICAL THERAPY | Age: 74
Setting detail: THERAPIES SERIES
Discharge: HOME | End: 2024-07-31
Payer: MEDICARE

## 2024-07-31 PROCEDURE — 97110 THERAPEUTIC EXERCISES: CPT

## 2024-07-31 PROCEDURE — 97530 THERAPEUTIC ACTIVITIES: CPT

## 2024-07-31 NOTE — FLOWSHEET NOTE
Outpatient Physical Therapy  [place]           [x] Phone: [phone]   Fax: [phone]  [place]           [] Phone: [phone]   Fax: [phone]        Physical Therapy Daily Treatment Note  Date:  2024    Patient Name:  Brittany Negrete    :  1950  MRN: [id number]  Restrictions/Precautions: NONE  Diagnosis:   Arthritis of left knee [M17.12] Diagnosis: L knee OA  Date of Injury/Surgery: --  Treatment Diagnosis:  Decreased L knee strength and ROM limited by pain  Insurance/Certification information: [employer]  Referring Physician:  Silvino Godinez PA-C Greg Mann   PCP: Julia Dai APRN - CNP  Next Doctor Visit:  --  Plan of care signed (Y/N):  N, sent 24  Outcome Measure: KOOS:  Visit# / total visits:   2/     PT APPROVED FOR 10 VISITS INCLUDES DAY OF EVAL     [id number]     24-24     [facility]# [id number]        Pain level: 0/10   Goals:     Patient goals: improve L knee pain  Short term goals  Time Frame for Short term goals: 5 weeks  Pt dmeo I w/ HEP and symptom management  Pt demo KOOS score <35% to improve ADL tolerance  Pt demo >4/5 MMT without increased pain  Pt demo > 10 sit to stands in 30 seconds without increased pain  Pt demo ability to ascend/descend 1 flight of stairs with non-reciprocal pattern and x1 handrail without increased pain    Summary of Evaluation:  Assessment: Pt is 73 year old female with chronic L knee pain for 12+ years without injury. She received a cortisone injection last week from Brent Godinez PA-C and had previous injections with success for approx. 1.5 months following. She has the most trouble with stair management and bending her knee without significant pain. She is doing better following her injection last week and is able to complete more functional activities without increased pain. Goal would be to develop a strengthening home program to improve tolerance to ADL's. Pt will benefit with PT services with

## 2024-08-05 ENCOUNTER — HOSPITAL ENCOUNTER (OUTPATIENT)
Dept: PHYSICAL THERAPY | Age: 74
Setting detail: THERAPIES SERIES
Discharge: HOME | End: 2024-08-05
Payer: MEDICARE

## 2024-08-05 PROCEDURE — 97530 THERAPEUTIC ACTIVITIES: CPT

## 2024-08-05 PROCEDURE — 97110 THERAPEUTIC EXERCISES: CPT

## 2024-08-05 NOTE — FLOWSHEET NOTE
benefit from ongonig skilled physical therapy to address deficits, return to PLOF, and reduce risk for further injury.   Pain at end of session: 0/10     Plan for Next Session: --consider progressing HEP strengthening exercises, consider SL leg press, leg EXT machine        Time In / Time Out:  8192-8306      Timed Code/Total Treatment Minutes:  40' 1 TA, 2 TE       Next Progress Note due:  10th visit      Plan of Care Interventions:  [x] Therapeutic Exercise  [] Modalities:  [x] Therapeutic Activity     [] Ultrasound  [] Estim  [x] Gait Training      [] Cervical Traction [] Lumbar Traction  [x] Neuromuscular Re-education    [] Cold/hotpack [] Iontophoresis   [x] Instruction in HEP      [x] Vasopneumatic   [] Dry Needling    [x] Manual Therapy               [] Aquatic Therapy              Electronically signed by:  Brooke Cuevas PT, DPT 7/29/2024, 8:44 AM

## 2024-08-06 DIAGNOSIS — R73.03 PREDIABETES: ICD-10-CM

## 2024-08-06 DIAGNOSIS — I10 PRIMARY HYPERTENSION: ICD-10-CM

## 2024-08-07 PROBLEM — E11.9 TYPE 2 DIABETES MELLITUS WITHOUT COMPLICATION, WITHOUT LONG-TERM CURRENT USE OF INSULIN (HCC): Status: ACTIVE | Noted: 2024-08-07

## 2024-08-08 ENCOUNTER — HOSPITAL ENCOUNTER (OUTPATIENT)
Dept: PHYSICAL THERAPY | Age: 74
Setting detail: THERAPIES SERIES
Discharge: HOME | End: 2024-08-08
Payer: MEDICARE

## 2024-08-08 PROCEDURE — 97110 THERAPEUTIC EXERCISES: CPT

## 2024-08-08 NOTE — FLOWSHEET NOTE
Outpatient Physical Therapy  [place]           [x] Phone: [phone]   Fax: [phone]  [place]           [] Phone: [phone]   Fax: [phone]        Physical Therapy Daily Treatment Note  Date:  2024    Patient Name:  Brittany Negrete    :  1950  MRN: [id number]  Restrictions/Precautions: NONE  Diagnosis:   Arthritis of left knee [M17.12] Diagnosis: L knee OA  Date of Injury/Surgery: --  Treatment Diagnosis:  Decreased L knee strength and ROM limited by pain  Insurance/Certification information: [employer]  Referring Physician:  Silvino Godinez PA-C Greg Mann   PCP: Julia Dai APRN - CNP  Next Doctor Visit:  --  Plan of care signed (Y/N):  y  Outcome Measure: KOOS:  Visit# / total visits:   4/10 by 24     PT APPROVED FOR 10 VISITS INCLUDES DAY OF EVAL     TE NR GAIT TA MAN VASO     24-24     AUTH# [id number]        Pain level: 0/10       Goals:     Patient goals: improve L knee pain  Short term goals  Time Frame for Short term goals: 5 weeks  Pt dmeo I w/ HEP and symptom management Reports compliance ()  Pt demo KOOS score <35% to improve ADL tolerance  Pt demo >4/5 MMT without increased pain  Pt demo > 10 sit to stands in 30 seconds without increased pain  Pt demo ability to ascend/descend 1 flight of stairs with non-reciprocal pattern and x1 handrail without increased pain    Summary of Evaluation:  Assessment: Pt is 73 year old female with chronic L knee pain for 12+ years without injury. She received a cortisone injection last week from Brent Godinez PA-C and had previous injections with success for approx. 1.5 months following. She has the most trouble with stair management and bending her knee without significant pain. She is doing better following her injection last week and is able to complete more functional activities without increased pain. Goal would be to develop a strengthening home program to improve tolerance to ADL's. Pt will benefit with PT

## 2024-08-12 ENCOUNTER — HOSPITAL ENCOUNTER (OUTPATIENT)
Dept: PHYSICAL THERAPY | Age: 74
Setting detail: THERAPIES SERIES
Discharge: HOME | End: 2024-08-12
Payer: MEDICARE

## 2024-08-12 PROCEDURE — 97110 THERAPEUTIC EXERCISES: CPT

## 2024-08-12 PROCEDURE — 97530 THERAPEUTIC ACTIVITIES: CPT

## 2024-08-12 NOTE — FLOWSHEET NOTE
visit      Plan of Care Interventions:  [x] Therapeutic Exercise  [] Modalities:  [x] Therapeutic Activity     [] Ultrasound  [] Estim  [x] Gait Training      [] Cervical Traction [] Lumbar Traction  [x] Neuromuscular Re-education    [] Cold/hotpack [] Iontophoresis   [x] Instruction in HEP      [x] Vasopneumatic   [] Dry Needling    [x] Manual Therapy               [] Aquatic Therapy              Electronically signed by: Maren Barton PTA    8:49 AM  8/12/2024

## 2024-08-14 ENCOUNTER — HOSPITAL ENCOUNTER (OUTPATIENT)
Dept: PHYSICAL THERAPY | Age: 74
Setting detail: THERAPIES SERIES
Discharge: HOME | End: 2024-08-14
Payer: MEDICARE

## 2024-08-14 PROCEDURE — 97530 THERAPEUTIC ACTIVITIES: CPT

## 2024-08-14 PROCEDURE — 97110 THERAPEUTIC EXERCISES: CPT

## 2024-08-14 NOTE — FLOWSHEET NOTE
services with progression of strength/ROM, manual and modalities to return to PLOF.      Subjective:  Brittany arrives to therapy stating there is no pain in the knee currently. She thinks therapy has been going well, but finds it difficult to change how she has been doing things.       Any changes in Ambulatory Summary Sheet?  None      Objective:    AROM L Knee  Flexion 128° - discomfort end range   Limited control on ecc during shuttle press    Exercises: (No more than 4 columns)   Exercise/Equipment 8/5/24 #3 #4 8/8/2024 #5 8/12/2024 8/14/24 #6            WARM UP        Nu Step   Lv5 5'  L5 5'  Lv5          TABLE       *Quad Set HEP - --    *SLR 2x10 ea side 2*10 ea side  --    *Heel slides HEP - --    Bridge 2x10 2*10 --    LAQ   2x10 ea side 3# 3# 2*10 ea --    HS curls c RTB X15 ea side RTB RTB 15* ea --           STANDING       Mini squats  X10 elevated table  - --    Step ups   --    Staggered STS c LLE back  X10 ea side 20\" table  - -- X10 ea side   Lateral band walks   - --    Step ups 4\" LLE   - 6\" 10* avoiding hyperextension  6\" 2x10 ea side   Hip Abduction/Extension RTB x10 ea side/dir RTB 10* ea dir bilaterally  RTB 2*10 ea dir bilaterally  RTB 2x10 ea side   Shuttle LP B   2C 10*  3C 10* 2C 2*10 2C 2x10   Ball press into wall    --    FM resisted walk    All directions 13# 4 laps ea  All directions 13# 4 laps ea    Wobble board    2*30\" ea dir            MODALITIES                         Other Therapeutic Activities/Education: none      Home Exercise Program:  *HO issued, reviewed and discussed with patient. All questions answered. Pt agreed to comply.        Manual Treatments:  --      Modalities:  --      Communication with other providers:  josieal sent 7/29/24       Assessment: Birttany's has improvements in knee ROM since starting therapy. She is able to tolerate steps well today and discussed proper foot placement. End session pain: 0/10      Plan for Next Session: --consider progressing HEP

## 2024-08-18 DIAGNOSIS — F41.9 ANXIETY: ICD-10-CM

## 2024-08-18 DIAGNOSIS — I47.19 ATRIAL TACHYCARDIA (HCC): ICD-10-CM

## 2024-08-18 DIAGNOSIS — I10 PRIMARY HYPERTENSION: ICD-10-CM

## 2024-08-19 ENCOUNTER — HOSPITAL ENCOUNTER (OUTPATIENT)
Dept: PHYSICAL THERAPY | Age: 74
Setting detail: THERAPIES SERIES
Discharge: HOME | End: 2024-08-19
Payer: MEDICARE

## 2024-08-19 PROCEDURE — 97530 THERAPEUTIC ACTIVITIES: CPT

## 2024-08-19 PROCEDURE — 97110 THERAPEUTIC EXERCISES: CPT

## 2024-08-19 RX ORDER — LOSARTAN POTASSIUM 25 MG/1
25 TABLET ORAL DAILY
Qty: 90 TABLET | Refills: 0 | Status: SHIPPED | OUTPATIENT
Start: 2024-08-19

## 2024-08-19 RX ORDER — TRIAMTERENE AND HYDROCHLOROTHIAZIDE 37.5; 25 MG/1; MG/1
1 CAPSULE ORAL DAILY
Qty: 90 CAPSULE | Refills: 0 | Status: SHIPPED | OUTPATIENT
Start: 2024-08-19

## 2024-08-19 RX ORDER — AMLODIPINE BESYLATE 10 MG/1
10 TABLET ORAL DAILY
Qty: 90 TABLET | Refills: 0 | Status: SHIPPED | OUTPATIENT
Start: 2024-08-19

## 2024-08-19 RX ORDER — BUPROPION HYDROCHLORIDE 150 MG/1
150 TABLET ORAL EVERY MORNING
Qty: 90 TABLET | Refills: 1 | Status: SHIPPED | OUTPATIENT
Start: 2024-08-19

## 2024-08-19 RX ORDER — ROSUVASTATIN CALCIUM 5 MG/1
5 TABLET, COATED ORAL DAILY
Qty: 90 TABLET | Refills: 0 | Status: SHIPPED | OUTPATIENT
Start: 2024-08-19

## 2024-08-19 NOTE — FLOWSHEET NOTE
Outpatient Physical Therapy  [place]           [x] Phone: [phone]   Fax: [phone]  [place]           [] Phone: [phone]   Fax: [phone]        Physical Therapy Daily Treatment Note  Date:  2024    Patient Name:  Brittany Negrete    :  1950  MRN: [id number]  Restrictions/Precautions: NONE  Diagnosis:   Arthritis of left knee [M17.12] Diagnosis: L knee OA  Date of Injury/Surgery: --  Treatment Diagnosis:  Decreased L knee strength and ROM limited by pain  Insurance/Certification information: [employer]  Referring Physician:  Silvino Godinez PA-C Greg Mann   PCP: Julia Dai APRN - CNP  Next Doctor Visit:  --  Plan of care signed (Y/N):  y  Outcome Measure: KOOS:  Visit# / total visits:   7/10 by 24     PT APPROVED FOR 10 VISITS INCLUDES DAY OF EVAL     TE NR GAIT TA MAN VASO     24-24     AUTH# [id number]        Pain level: 0/10       Goals:     Patient goals: improve L knee pain  Short term goals  Time Frame for Short term goals: 5 weeks  Pt dmeo I w/ HEP and symptom management Reports compliance ()  Pt demo KOOS score <35% to improve ADL tolerance  Pt demo >4/5 MMT without increased pain  Pt demo > 10 sit to stands in 30 seconds without increased pain  Pt demo ability to ascend/descend 1 flight of stairs with non-reciprocal pattern and x1 handrail without increased pain    Summary of Evaluation:  Assessment: Pt is 73 year old female with chronic L knee pain for 12+ years without injury. She received a cortisone injection last week from Brent Godinez PA-C and had previous injections with success for approx. 1.5 months following. She has the most trouble with stair management and bending her knee without significant pain. She is doing better following her injection last week and is able to complete more functional activities without increased pain. Goal would be to develop a strengthening home program to improve tolerance to ADL's. Pt will benefit with PT

## 2024-08-21 ENCOUNTER — HOSPITAL ENCOUNTER (OUTPATIENT)
Dept: PHYSICAL THERAPY | Age: 74
Setting detail: THERAPIES SERIES
Discharge: HOME | End: 2024-08-21
Payer: MEDICARE

## 2024-08-21 PROCEDURE — 97530 THERAPEUTIC ACTIVITIES: CPT

## 2024-08-21 PROCEDURE — 97110 THERAPEUTIC EXERCISES: CPT

## 2024-08-21 NOTE — FLOWSHEET NOTE
Outpatient Physical Therapy  [place]           [x] Phone: [phone]   Fax: [phone]  [place]           [] Phone: [phone]   Fax: [phone]        Physical Therapy Daily Treatment Note  Date:  2024    Patient Name:  Brittany Negrete    :  1950  MRN: [id number]  Restrictions/Precautions: NONE  Diagnosis:   Arthritis of left knee [M17.12] Diagnosis: L knee OA  Date of Injury/Surgery: --  Treatment Diagnosis:  Decreased L knee strength and ROM limited by pain  Insurance/Certification information: [employer]  Referring Physician:  Silvino Godinez PA-C Greg Mann   PCP: Julia Dai APRN - CNP  Next Doctor Visit:  --  Plan of care signed (Y/N):  y  Outcome Measure: KOOS:  Visit# / total visits:   8/10 by 24     PT APPROVED FOR 10 VISITS INCLUDES DAY OF EVAL     TE NR GAIT TA MAN VASO     24-24     AUTH# [id number]        Pain level: 3-4/10       Goals:     Patient goals: improve L knee pain  Short term goals  Time Frame for Short term goals: 5 weeks  Pt dmeo I w/ HEP and symptom management Reports compliance ()  Pt demo KOOS score <35% to improve ADL tolerance  Pt demo >4/5 MMT without increased pain  Pt demo > 10 sit to stands in 30 seconds without increased pain  Pt demo ability to ascend/descend 1 flight of stairs with non-reciprocal pattern and x1 handrail without increased pain    Summary of Evaluation:  Assessment: Pt is 73 year old female with chronic L knee pain for 12+ years without injury. She received a cortisone injection last week from Brent Godinez PA-C and had previous injections with success for approx. 1.5 months following. She has the most trouble with stair management and bending her knee without significant pain. She is doing better following her injection last week and is able to complete more functional activities without increased pain. Goal would be to develop a strengthening home program to improve tolerance to ADL's. Pt will benefit with PT

## 2024-08-26 ENCOUNTER — HOSPITAL ENCOUNTER (OUTPATIENT)
Dept: PHYSICAL THERAPY | Age: 74
Discharge: HOME | End: 2024-08-26

## 2024-08-26 NOTE — FLOWSHEET NOTE
Physical Therapy  Cancellation/No-show Note  Patient Name:  Brittany Negrete  :  1950   Date:  2024  Cancelled visits to date: 1  No-shows to date: 0    For today's appointment patient:  [x]  Cancelled  []  Rescheduled appointment  []  No-show     Reason given by patient:  []  Patient ill  []  Conflicting appointment  []  No transportation    []  Conflict with work  []  No reason given  []  Other:     Comments:  Patient called has stomach issues today will hope to be in Wednesday.     Electronically signed by:  Brooke Cuevas, PT, DPT

## 2024-08-28 ENCOUNTER — HOSPITAL ENCOUNTER (OUTPATIENT)
Dept: PHYSICAL THERAPY | Age: 74
Setting detail: THERAPIES SERIES
Discharge: HOME | End: 2024-08-28
Payer: MEDICARE

## 2024-08-28 PROCEDURE — 97110 THERAPEUTIC EXERCISES: CPT

## 2024-08-28 PROCEDURE — 97530 THERAPEUTIC ACTIVITIES: CPT

## 2024-08-28 NOTE — DISCHARGE SUMMARY
Outpatient Physical Therapy           [place]           [] Phone: [phone]   Fax: [phone]  [place]           [] Phone: [phone]   Fax: [phone]      To: Silvino Godinez PA-C     From: Brooke Cuevas PT, PT     Patient: Brittany Negrete                    : 1950  Diagnosis:  Arthritis of left knee [M17.12]        Treatment Diagnosis:       Date: 2024  []  Progress Note                [x]  Discharge Note    Evaluation Date:  24   Total Visits to date:   9 Cancels/No-shows to date:  1    Subjective:  Bettie reports no pain in either knee today. She has been doing well, but having some stomach trouble. She is concerned about the cortisone injection wearing off at 1.5 months out.        Plan of Care/Treatment to date:  [x] Therapeutic Exercise    [] Modalities:  [x] Therapeutic Activity     [] Ultrasound  [] Electrical Stimulation  [x] Gait Training      [] Cervical Traction   [] Lumbar Traction  [x] Neuromuscular Re-education  [] Cold/hotpack [] Iontophoresis  [x] Instruction in HEP      Other:  [x] Manual Therapy       []  Vasopneumatic  [] Aquatic Therapy       []   Dry Needle Therapy                      Objective/Significant Findings At Last Visit/Comments:        Left AROM  Right AROM      AROM LLE (degrees)  LLE General AROM:   Knee flexion: 127 deg   Knee extension: 8 deg hyperextension AROM RLE (degrees)  RLE General AROM:   Knee flexion: 130 deg   Knee extension: 8 deg hyperextension         Left Strength  Right Strength      Strength LLE  Strength LLE: WFL   Strength RLE  Strength RLE: WFL      Quad set: fair; SLR sligth quad lag on L side, no pain either side    6MWT: 1,489  ft without increased pain  30 sec STS: 14 times without UE assist, ache in the knee     Joint Mobility  ROM LLE: patellar joint mobility: no significant restrictions bilaterally     Stairs:able to ascend with reciprocal pattner, non-reciprocal descend due to pain     KOOS:    Assessment:    Bettie has completed 9 visits since the start of therapy on 7/29/24. She has seen good improvements in knee ROM and strength especially WB tolerance. She can navigate stairs without safety concerns or restrictive pain. Discussed the importance of consistency with her home strengthening program long term. She will be discharged at this time to continue her home program independently.       Goal Status:  [x] Achieved [] Partially Achieved  [] Not Achieved     Patient goals: improve L knee pain  Short term goals  Time Frame for Short term goals: 5 weeks  Pt dmeo I w/ HEP and symptom management Reports compliance  Pt demo KOOS score <35% to improve ADL tolerance Improved  Pt demo >4/5 MMT without increased pain MET  Pt demo > 10 sit to stands in 30 seconds without increased pain MET  Pt demo ability to ascend/descend 1 flight of stairs with non-reciprocal pattern and x1 handrail without increased pain Ascend MET< descend still painful      Frequency/Duration:  # Days per week: [] 1 day # Weeks: [] 1 week [] 4 weeks [] 8 weeks     [x] 2 days   [] 2 weeks [x] 5 weeks [] 10 weeks     [] 3 days   [] 3 weeks [] 6 weeks [] 12 weeks       Rehab Potential: [] Excellent [x] Good [] Fair  [] Poor         Patient Status: [] Continue per initial plan of Care     [x] Patient now discharged     [] Additional visits requested, Please re-certify for additional visits:         If we are requesting more visits, we fully anticipate the patient's condition is expected to improve within the treatment timeframe we are requesting.    Electronically signed by:  Brooke Cuevas PT, DPT, 8/28/2024, 9:04 AM    If you have any questions or concerns, please don't hesitate to call.  Thank you for your referral.    Physician Signature:______________________ Date:______ Time: ________  By signing above, therapist’s plan is approved by physician

## 2024-08-28 NOTE — FLOWSHEET NOTE
Outpatient Physical Therapy  [place]           [x] Phone: [phone]   Fax: [phone]  [place]           [] Phone: [phone]   Fax: [phone]        Physical Therapy Daily Treatment Note  Date:  2024    Patient Name:  Brittany Negrete    :  1950  MRN: [id number]  Restrictions/Precautions: NONE  Diagnosis:   Arthritis of left knee [M17.12] Diagnosis: L knee OA  Date of Injury/Surgery: --  Treatment Diagnosis:  Decreased L knee strength and ROM limited by pain  Insurance/Certification information: [employer]  Referring Physician:  Silvino Godinez PA-C Greg Mann   PCP: Julia Dai APRN - CNP  Next Doctor Visit:  --  Plan of care signed (Y/N):  y  Outcome Measure: KOOS:  Visit# / total visits:   9/10 by 24     PT APPROVED FOR 10 VISITS INCLUDES DAY OF EVAL     TE NR GAIT TA MAN VASO     24-24     AUTH# [id number]        Pain level: 3-4/10       Goals:     Patient goals: improve L knee pain  Short term goals  Time Frame for Short term goals: 5 weeks  Pt dmeo I w/ HEP and symptom management Reports compliance  Pt demo KOOS score <35% to improve ADL tolerance Improved  Pt demo >4/5 MMT without increased pain MET  Pt demo > 10 sit to stands in 30 seconds without increased pain MET  Pt demo ability to ascend/descend 1 flight of stairs with non-reciprocal pattern and x1 handrail without increased pain Ascend MET< descend still painful    Summary of Evaluation:  Assessment: Pt is 73 year old female with chronic L knee pain for 12+ years without injury. She received a cortisone injection last week from Brent Godinez PA-C and had previous injections with success for approx. 1.5 months following. She has the most trouble with stair management and bending her knee without significant pain. She is doing better following her injection last week and is able to complete more functional activities without increased pain. Goal would be to develop a strengthening home program to improve  Traction [] Lumbar Traction  [x] Neuromuscular Re-education    [] Cold/hotpack [] Iontophoresis   [x] Instruction in HEP      [x] Vasopneumatic   [] Dry Needling    [x] Manual Therapy               [] Aquatic Therapy              Electronically signed by: Brooke Cuevas, PT,DPT    8/28/2024

## 2024-08-29 ENCOUNTER — OFFICE VISIT (OUTPATIENT)
Dept: ORTHOPEDIC SURGERY | Age: 74
End: 2024-08-29
Payer: MEDICARE

## 2024-08-29 VITALS — HEART RATE: 83 BPM | OXYGEN SATURATION: 94 %

## 2024-08-29 DIAGNOSIS — M17.12 ARTHRITIS OF LEFT KNEE: Primary | ICD-10-CM

## 2024-08-29 DIAGNOSIS — I10 PRIMARY HYPERTENSION: ICD-10-CM

## 2024-08-29 DIAGNOSIS — R35.0 URINARY FREQUENCY: ICD-10-CM

## 2024-08-29 DIAGNOSIS — I47.19 ATRIAL TACHYCARDIA (HCC): ICD-10-CM

## 2024-08-29 PROCEDURE — 1036F TOBACCO NON-USER: CPT

## 2024-08-29 PROCEDURE — 3017F COLORECTAL CA SCREEN DOC REV: CPT

## 2024-08-29 PROCEDURE — 1123F ACP DISCUSS/DSCN MKR DOCD: CPT

## 2024-08-29 PROCEDURE — G8427 DOCREV CUR MEDS BY ELIG CLIN: HCPCS

## 2024-08-29 PROCEDURE — G8399 PT W/DXA RESULTS DOCUMENT: HCPCS

## 2024-08-29 PROCEDURE — 1090F PRES/ABSN URINE INCON ASSESS: CPT

## 2024-08-29 PROCEDURE — 99213 OFFICE O/P EST LOW 20 MIN: CPT

## 2024-08-29 PROCEDURE — G8417 CALC BMI ABV UP PARAM F/U: HCPCS

## 2024-08-29 RX ORDER — AMLODIPINE BESYLATE 10 MG/1
10 TABLET ORAL DAILY
Qty: 90 TABLET | Refills: 1 | Status: SHIPPED | OUTPATIENT
Start: 2024-08-29

## 2024-08-29 RX ORDER — MIRABEGRON 50 MG/1
50 TABLET, FILM COATED, EXTENDED RELEASE ORAL DAILY
Qty: 90 TABLET | Refills: 1 | Status: SHIPPED | OUTPATIENT
Start: 2024-08-29

## 2024-08-29 RX ORDER — LOSARTAN POTASSIUM 25 MG/1
25 TABLET ORAL DAILY
Qty: 90 TABLET | Refills: 1 | Status: SHIPPED | OUTPATIENT
Start: 2024-08-29

## 2024-08-29 RX ORDER — METOPROLOL TARTRATE 25 MG/1
25 TABLET, FILM COATED ORAL 2 TIMES DAILY
Qty: 180 TABLET | Refills: 1 | Status: SHIPPED | OUTPATIENT
Start: 2024-08-29

## 2024-08-29 RX ORDER — ROSUVASTATIN CALCIUM 5 MG/1
5 TABLET, COATED ORAL DAILY
Qty: 90 TABLET | Refills: 1 | Status: SHIPPED | OUTPATIENT
Start: 2024-08-29

## 2024-09-10 ASSESSMENT — ENCOUNTER SYMPTOMS
FACIAL SWELLING: 0
SHORTNESS OF BREATH: 0
NAUSEA: 0
RHINORRHEA: 0
BACK PAIN: 0
COUGH: 0

## 2024-09-20 ENCOUNTER — OFFICE VISIT (OUTPATIENT)
Dept: PULMONOLOGY | Age: 74
End: 2024-09-20
Payer: MEDICARE

## 2024-09-20 VITALS
OXYGEN SATURATION: 97 % | BODY MASS INDEX: 44.21 KG/M2 | SYSTOLIC BLOOD PRESSURE: 120 MMHG | RESPIRATION RATE: 16 BRPM | HEIGHT: 60 IN | WEIGHT: 225.2 LBS | DIASTOLIC BLOOD PRESSURE: 58 MMHG | HEART RATE: 67 BPM

## 2024-09-20 DIAGNOSIS — G47.33 OSA (OBSTRUCTIVE SLEEP APNEA): Primary | ICD-10-CM

## 2024-09-20 DIAGNOSIS — G47.10 HYPERSOMNIA: ICD-10-CM

## 2024-09-20 DIAGNOSIS — E66.01 MORBID OBESITY WITH BMI OF 40.0-44.9, ADULT (HCC): ICD-10-CM

## 2024-09-20 PROCEDURE — 1123F ACP DISCUSS/DSCN MKR DOCD: CPT | Performed by: INTERNAL MEDICINE

## 2024-09-20 PROCEDURE — 3017F COLORECTAL CA SCREEN DOC REV: CPT | Performed by: INTERNAL MEDICINE

## 2024-09-20 PROCEDURE — G8427 DOCREV CUR MEDS BY ELIG CLIN: HCPCS | Performed by: INTERNAL MEDICINE

## 2024-09-20 PROCEDURE — G8399 PT W/DXA RESULTS DOCUMENT: HCPCS | Performed by: INTERNAL MEDICINE

## 2024-09-20 PROCEDURE — 3078F DIAST BP <80 MM HG: CPT | Performed by: INTERNAL MEDICINE

## 2024-09-20 PROCEDURE — G8417 CALC BMI ABV UP PARAM F/U: HCPCS | Performed by: INTERNAL MEDICINE

## 2024-09-20 PROCEDURE — 3074F SYST BP LT 130 MM HG: CPT | Performed by: INTERNAL MEDICINE

## 2024-09-20 PROCEDURE — 99214 OFFICE O/P EST MOD 30 MIN: CPT | Performed by: INTERNAL MEDICINE

## 2024-09-20 PROCEDURE — 1090F PRES/ABSN URINE INCON ASSESS: CPT | Performed by: INTERNAL MEDICINE

## 2024-09-20 PROCEDURE — 1036F TOBACCO NON-USER: CPT | Performed by: INTERNAL MEDICINE

## 2024-09-20 ASSESSMENT — ENCOUNTER SYMPTOMS
BACK PAIN: 0
SHORTNESS OF BREATH: 0
ABDOMINAL DISTENTION: 0
EYE DISCHARGE: 0
COUGH: 0
ABDOMINAL PAIN: 0
EYE ITCHING: 0

## 2024-10-02 ENCOUNTER — OFFICE VISIT MH/BH (OUTPATIENT)
Dept: BARIATRICS/WEIGHT MGMT | Age: 74
End: 2024-10-02
Payer: MEDICARE

## 2024-10-02 VITALS
WEIGHT: 225.5 LBS | DIASTOLIC BLOOD PRESSURE: 74 MMHG | OXYGEN SATURATION: 95 % | BODY MASS INDEX: 42.57 KG/M2 | SYSTOLIC BLOOD PRESSURE: 112 MMHG | HEIGHT: 61 IN | HEART RATE: 88 BPM

## 2024-10-02 DIAGNOSIS — Z79.899 MEDICATION MANAGEMENT: ICD-10-CM

## 2024-10-02 DIAGNOSIS — E66.01 MORBID OBESITY WITH BMI OF 40.0-44.9, ADULT: Primary | ICD-10-CM

## 2024-10-02 PROCEDURE — G8427 DOCREV CUR MEDS BY ELIG CLIN: HCPCS | Performed by: NURSE PRACTITIONER

## 2024-10-02 PROCEDURE — G8484 FLU IMMUNIZE NO ADMIN: HCPCS | Performed by: NURSE PRACTITIONER

## 2024-10-02 PROCEDURE — 3074F SYST BP LT 130 MM HG: CPT | Performed by: NURSE PRACTITIONER

## 2024-10-02 PROCEDURE — G8417 CALC BMI ABV UP PARAM F/U: HCPCS | Performed by: NURSE PRACTITIONER

## 2024-10-02 PROCEDURE — 1090F PRES/ABSN URINE INCON ASSESS: CPT | Performed by: NURSE PRACTITIONER

## 2024-10-02 PROCEDURE — 1123F ACP DISCUSS/DSCN MKR DOCD: CPT | Performed by: NURSE PRACTITIONER

## 2024-10-02 PROCEDURE — G8399 PT W/DXA RESULTS DOCUMENT: HCPCS | Performed by: NURSE PRACTITIONER

## 2024-10-02 PROCEDURE — 3078F DIAST BP <80 MM HG: CPT | Performed by: NURSE PRACTITIONER

## 2024-10-02 PROCEDURE — 99204 OFFICE O/P NEW MOD 45 MIN: CPT | Performed by: NURSE PRACTITIONER

## 2024-10-02 PROCEDURE — 3017F COLORECTAL CA SCREEN DOC REV: CPT | Performed by: NURSE PRACTITIONER

## 2024-10-02 PROCEDURE — 1036F TOBACCO NON-USER: CPT | Performed by: NURSE PRACTITIONER

## 2024-10-02 RX ORDER — MAGNESIUM 30 MG
30 TABLET ORAL 2 TIMES DAILY
COMMUNITY

## 2024-10-02 NOTE — PROGRESS NOTES
Chief Complaint   Patient presents with    Bariatric, Initial Visit     1st Medication WM Visit  Has Bar Cov         SUBJECTIVE:    HPI: Patient is here with complaints of weight management.    Obesity with a BMI of Body mass index is 42.61 kg/m²..    Patient has failed to lose 5% of body weight for many years. She has tried multiple diet plans and fad diets in past with mixed results. She has concerns that all diet plans and weight loss medications \"do not work \" for her. She is currently taking Ozempic without change in her appetite or weight.     Any history of type 2 diabetes mellitus? Prediabetes  If yes, any current GLP-1 receptor agonists or sulfonylureas? (must consider reducing dose of insulin or sulfonylureas by 1/2 to reduce risk of hypoglycemia).    Any current renal impairment? Controlled    Any current hepatic impairment? Denies    Any history of pancreatitis? Denies    Any history of gastroparesis? Denies   (Saxenda has not been studied in patients with pre-existing gastroparesis).    Any history of personal or family medullary thyroid cancinoma (MTC) or multiple endocrine neoplasia type 2 (MEN 2)? Denies (black box warning).    Any current or history suicidal attempts or active suicidal ideation? Denies   (avoid in these patients)    Current weight of 225 pounds    Diet: not currently on a diet plan; frequently eats carbs and convenience foods. She is reluctant to change her diet plan.    Exercise: denies    I have reviewed the patient's(pertinent information to this visit) medical history, family history(scanned in  the [employer] under \"patient questioner\"), social history and review of systems with the patient today in the office.          Past Surgical History:   Procedure Laterality Date    CARPAL TUNNEL RELEASE Bilateral     FRACTURE SURGERY      Left ankle     HERNIA REPAIR  1998    [facility] (Dr Fierro)    TUBAL LIGATION         Past Medical History:   Diagnosis Date    Anxiety 4/21    H/O

## 2024-10-10 ENCOUNTER — OFFICE VISIT (OUTPATIENT)
Dept: ORTHOPEDIC SURGERY | Age: 74
End: 2024-10-10
Payer: MEDICARE

## 2024-10-10 VITALS
TEMPERATURE: 97.2 F | HEART RATE: 83 BPM | OXYGEN SATURATION: 96 % | RESPIRATION RATE: 14 BRPM | BODY MASS INDEX: 42.48 KG/M2 | HEIGHT: 61 IN | WEIGHT: 225 LBS

## 2024-10-10 DIAGNOSIS — M17.12 ARTHRITIS OF LEFT KNEE: Primary | ICD-10-CM

## 2024-10-10 PROCEDURE — 3017F COLORECTAL CA SCREEN DOC REV: CPT

## 2024-10-10 PROCEDURE — 20610 DRAIN/INJ JOINT/BURSA W/O US: CPT

## 2024-10-10 PROCEDURE — G8427 DOCREV CUR MEDS BY ELIG CLIN: HCPCS

## 2024-10-10 PROCEDURE — 99213 OFFICE O/P EST LOW 20 MIN: CPT

## 2024-10-10 PROCEDURE — G8417 CALC BMI ABV UP PARAM F/U: HCPCS

## 2024-10-10 PROCEDURE — G8399 PT W/DXA RESULTS DOCUMENT: HCPCS

## 2024-10-10 PROCEDURE — 1036F TOBACCO NON-USER: CPT

## 2024-10-10 PROCEDURE — 1090F PRES/ABSN URINE INCON ASSESS: CPT

## 2024-10-10 PROCEDURE — G8484 FLU IMMUNIZE NO ADMIN: HCPCS

## 2024-10-10 PROCEDURE — 1123F ACP DISCUSS/DSCN MKR DOCD: CPT

## 2024-10-10 RX ORDER — TRIAMCINOLONE ACETONIDE 40 MG/ML
80 INJECTION, SUSPENSION INTRA-ARTICULAR; INTRAMUSCULAR ONCE
Status: COMPLETED | OUTPATIENT
Start: 2024-10-10 | End: 2024-10-10

## 2024-10-10 RX ADMIN — TRIAMCINOLONE ACETONIDE 80 MG: 40 INJECTION, SUSPENSION INTRA-ARTICULAR; INTRAMUSCULAR at 14:16

## 2024-10-10 ASSESSMENT — ENCOUNTER SYMPTOMS
COUGH: 0
BACK PAIN: 0
SHORTNESS OF BREATH: 0
NAUSEA: 0
RHINORRHEA: 0
FACIAL SWELLING: 0

## 2024-10-10 NOTE — PROGRESS NOTES
Patient seen in office today for: _ f/u-left knee injection given 7/18/24  NO FALLS OR INJURIES   INJECTION WORKED REALLY WELL THIS TIME   Patient reports 0/10 pain.  RICE and medication are effective to alleviate pain and reduce swelling.   Pain also alleviated by: OTC meds as needed  Pain worsened by: Patient reports painful ROM & weight bearing.   She has completed PT   Patient is interested in injection today

## 2024-10-10 NOTE — PATIENT INSTRUCTIONS
Follow up as needed   Left knee injection given today   Continue weight-bearing as tolerated.  Continue range of motion exercises as instructed.  Ice and elevate as needed.  Tylenol or Motrin for pain.

## 2024-10-10 NOTE — PROGRESS NOTES
10/10/2024   Chief Complaint   Patient presents with    Knee Pain      f/u-left knee injection given 7/18/24            History of Present Illness:       Today's HPI:  Patient is a 73-year-old female returning to the office today for continued management of left knee pain.  Patient states the injection she received in July greatly helped her symptoms but is starting to wear off.  She notes that her pain today is slightly less than her previous baseline.  She is interested in receiving another injection at today's visit.    Previous HPI:  Patient is a 73-year-old female returning to the office today for continued management of left knee pain.  Patient received an injection on 7/18/2024 and states that the injection greatly helped her symptoms.  She is overall happy with her progress and denies any pain at today's visit.  She states she does have some mild soreness with more robust physical therapy sessions.    Patient seen in office today for:  f/u-left knee injection given 7/18/24. Pt reports that the injection has been helpful this time. Pt reports she has physical therapy yesterday was the last session and it was also helpful.     Patient reports 0 /10 pain.  RICE and medication are effective to alleviate pain and reduce swelling.   Pain worsened by: Patient reports painful ROM & weight bearing.      Medical History  Patient's medications, allergies, past medical, surgical, social and family histories were reviewed and updated as appropriate.    Past Medical History:   Diagnosis Date    Anxiety 4/21    H/O echocardiogram 09/11/2020    EF 55-60%, Mild MR, TR & LVH.    History of nuclear stress test 09/03/2020    EF 69%, ABN, Mild degree of lateral wall ischemia, Exercise induced frequent PVCs and ventricular couplets noted    Hx of exercise stress test 07/28/2021    Treadmill    Hyperlipidemia     Hypertension     Nocturia     Obesity     Sleep apnea 5/23    Urinary incontinence      Past Surgical History:

## 2024-10-16 ENCOUNTER — OFFICE VISIT (OUTPATIENT)
Dept: BARIATRICS/WEIGHT MGMT | Age: 74
End: 2024-10-16
Payer: MEDICARE

## 2024-10-16 VITALS
BODY MASS INDEX: 42.01 KG/M2 | OXYGEN SATURATION: 96 % | SYSTOLIC BLOOD PRESSURE: 124 MMHG | WEIGHT: 222.5 LBS | HEIGHT: 61 IN | DIASTOLIC BLOOD PRESSURE: 72 MMHG | HEART RATE: 83 BPM

## 2024-10-16 DIAGNOSIS — Z79.899 MEDICATION MANAGEMENT: ICD-10-CM

## 2024-10-16 DIAGNOSIS — E66.01 MORBID OBESITY WITH BMI OF 40.0-44.9, ADULT: Primary | ICD-10-CM

## 2024-10-16 PROCEDURE — 1090F PRES/ABSN URINE INCON ASSESS: CPT | Performed by: NURSE PRACTITIONER

## 2024-10-16 PROCEDURE — G8417 CALC BMI ABV UP PARAM F/U: HCPCS | Performed by: NURSE PRACTITIONER

## 2024-10-16 PROCEDURE — 3017F COLORECTAL CA SCREEN DOC REV: CPT | Performed by: NURSE PRACTITIONER

## 2024-10-16 PROCEDURE — 3074F SYST BP LT 130 MM HG: CPT | Performed by: NURSE PRACTITIONER

## 2024-10-16 PROCEDURE — G8399 PT W/DXA RESULTS DOCUMENT: HCPCS | Performed by: NURSE PRACTITIONER

## 2024-10-16 PROCEDURE — 99213 OFFICE O/P EST LOW 20 MIN: CPT | Performed by: NURSE PRACTITIONER

## 2024-10-16 PROCEDURE — G8427 DOCREV CUR MEDS BY ELIG CLIN: HCPCS | Performed by: NURSE PRACTITIONER

## 2024-10-16 PROCEDURE — 1123F ACP DISCUSS/DSCN MKR DOCD: CPT | Performed by: NURSE PRACTITIONER

## 2024-10-16 PROCEDURE — 3078F DIAST BP <80 MM HG: CPT | Performed by: NURSE PRACTITIONER

## 2024-10-16 PROCEDURE — 1036F TOBACCO NON-USER: CPT | Performed by: NURSE PRACTITIONER

## 2024-10-16 PROCEDURE — G8484 FLU IMMUNIZE NO ADMIN: HCPCS | Performed by: NURSE PRACTITIONER

## 2024-10-16 NOTE — PROGRESS NOTES
Chief Complaint   Patient presents with    Weight Management     F/U Medication WM Visit - Planned Zepbound (Not Covered by INS) Has Bar Cov         SUBJECTIVE:    HPI: Patient is here with complaints of weight gain and inability to lose weight per self.  Inquiring about weight loss medications to assist with their weight loss goal.    Obesity with a BMI of Body mass index is 42.04 kg/m²..    Patient has failed to lose 5% of body weight for many years.    Any history of glaucoma? DENIES    Any history of drug abuse? DENIES    Any history of CAD, uncontrolled HTN, arrhythmias, stroke, or CHF?? DENIES    Any history of hyperthyroidism? Denies    Any current or recent use of MAOIs? DENIES    Current dietary measures: less carb intake recently    Current exercise measures: limited    I have reviewed the patient's(pertinent information to this visit) medical history, family history(scanned in  the [employer] under \"patient questioner\"), social history and review of systems with the patient today in the office.          Past Surgical History:   Procedure Laterality Date    CARPAL TUNNEL RELEASE Bilateral     CYSTOSCOPY  10/11/2024    with Botox Injection to Bladder    FRACTURE SURGERY      Left ankle     HERNIA REPAIR  1998    [facility] (Dr Fierro)    TUBAL LIGATION         Past Medical History:   Diagnosis Date    Anxiety 4/21    H/O echocardiogram 09/11/2020    EF 55-60%, Mild MR, TR & LVH.    History of nuclear stress test 09/03/2020    EF 69%, ABN, Mild degree of lateral wall ischemia, Exercise induced frequent PVCs and ventricular couplets noted    Hx of exercise stress test 07/28/2021    Treadmill    Hyperlipidemia     Hypertension     Nocturia     Obesity     Sleep apnea 5/23    Urinary incontinence        Family History   Problem Relation Age of Onset    Hypertension Mother     Stroke Mother     Hearing Loss Mother     Parkinsonism Father     Heart Attack Father        Social History     Socioeconomic History

## 2024-11-05 PROBLEM — E11.22 TYPE 2 DIABETES MELLITUS WITH CHRONIC KIDNEY DISEASE (HCC): Status: ACTIVE | Noted: 2024-11-05

## 2024-12-10 SDOH — HEALTH STABILITY: PHYSICAL HEALTH: ON AVERAGE, HOW MANY DAYS PER WEEK DO YOU ENGAGE IN MODERATE TO STRENUOUS EXERCISE (LIKE A BRISK WALK)?: 0 DAYS

## 2024-12-13 ENCOUNTER — OFFICE VISIT (OUTPATIENT)
Dept: FAMILY MEDICINE CLINIC | Age: 74
End: 2024-12-13

## 2024-12-13 VITALS
OXYGEN SATURATION: 94 % | DIASTOLIC BLOOD PRESSURE: 80 MMHG | HEART RATE: 93 BPM | SYSTOLIC BLOOD PRESSURE: 134 MMHG | BODY MASS INDEX: 42.1 KG/M2 | WEIGHT: 223 LBS | HEIGHT: 61 IN

## 2024-12-13 DIAGNOSIS — N18.32 TYPE 2 DIABETES MELLITUS WITH STAGE 3B CHRONIC KIDNEY DISEASE, WITHOUT LONG-TERM CURRENT USE OF INSULIN (HCC): ICD-10-CM

## 2024-12-13 DIAGNOSIS — I10 PRIMARY HYPERTENSION: ICD-10-CM

## 2024-12-13 DIAGNOSIS — I47.19 ATRIAL TACHYCARDIA (HCC): ICD-10-CM

## 2024-12-13 DIAGNOSIS — R11.0 NAUSEA: ICD-10-CM

## 2024-12-13 DIAGNOSIS — E11.22 TYPE 2 DIABETES MELLITUS WITH STAGE 3B CHRONIC KIDNEY DISEASE, WITHOUT LONG-TERM CURRENT USE OF INSULIN (HCC): ICD-10-CM

## 2024-12-13 DIAGNOSIS — N18.32 TYPE 2 DIABETES MELLITUS WITH STAGE 3B CHRONIC KIDNEY DISEASE, WITHOUT LONG-TERM CURRENT USE OF INSULIN (HCC): Primary | ICD-10-CM

## 2024-12-13 DIAGNOSIS — E11.22 TYPE 2 DIABETES MELLITUS WITH STAGE 3B CHRONIC KIDNEY DISEASE, WITHOUT LONG-TERM CURRENT USE OF INSULIN (HCC): Primary | ICD-10-CM

## 2024-12-13 DIAGNOSIS — F41.9 ANXIETY: ICD-10-CM

## 2024-12-13 LAB
ALBUMIN SERPL-MCNC: 4.2 G/DL (ref 3.4–5)
ALP SERPL-CCNC: 72 U/L (ref 40–129)
ALT SERPL-CCNC: 24 U/L (ref 10–40)
AST SERPL-CCNC: 28 U/L (ref 15–37)
BILIRUB DIRECT SERPL-MCNC: 0.2 MG/DL (ref 0–0.3)
BILIRUB INDIRECT SERPL-MCNC: 0.1 MG/DL (ref 0–1)
BILIRUB SERPL-MCNC: 0.3 MG/DL (ref 0–1)
CHOLEST SERPL-MCNC: 133 MG/DL (ref 0–199)
EST. AVERAGE GLUCOSE BLD GHB EST-MCNC: 119.8 MG/DL
HBA1C MFR BLD: 5.8 %
HDLC SERPL-MCNC: 52 MG/DL (ref 40–60)
LDLC SERPL CALC-MCNC: 48 MG/DL
PROT SERPL-MCNC: 6.2 G/DL (ref 6.4–8.2)
TRIGL SERPL-MCNC: 164 MG/DL (ref 0–150)
VLDLC SERPL CALC-MCNC: 33 MG/DL

## 2024-12-13 RX ORDER — AMLODIPINE BESYLATE 10 MG/1
10 TABLET ORAL DAILY
Qty: 90 TABLET | Refills: 1 | Status: SHIPPED | OUTPATIENT
Start: 2024-12-13

## 2024-12-13 RX ORDER — METOPROLOL TARTRATE 25 MG/1
25 TABLET, FILM COATED ORAL 2 TIMES DAILY
Qty: 180 TABLET | Refills: 1 | Status: SHIPPED | OUTPATIENT
Start: 2024-12-13

## 2024-12-13 RX ORDER — ROSUVASTATIN CALCIUM 5 MG/1
5 TABLET, COATED ORAL DAILY
Qty: 90 TABLET | Refills: 1 | Status: SHIPPED | OUTPATIENT
Start: 2024-12-13

## 2024-12-13 RX ORDER — LOSARTAN POTASSIUM 25 MG/1
25 TABLET ORAL DAILY
Qty: 90 TABLET | Refills: 1 | Status: SHIPPED | OUTPATIENT
Start: 2024-12-13

## 2024-12-13 RX ORDER — DULOXETIN HYDROCHLORIDE 30 MG/1
30 CAPSULE, DELAYED RELEASE ORAL DAILY
Qty: 90 CAPSULE | Refills: 1 | Status: SHIPPED | OUTPATIENT
Start: 2024-12-13

## 2024-12-13 NOTE — PROGRESS NOTES
Outpatient Clinic Visit Note    Patient: Brittany Negrete  : 1950 (74 y.o.)  Date: 2024    CC:  Chief Complaint   Patient presents with    Established New Doctor     New to provider    Medication Refill     Regular check up and refills.      Other     Patient would like to discuss cymbalta recall.  Patient states she struggles with constipation.  Patient states she has dry heaves on and off in the morning        HPI: in the past she has had gel shots and cortisone shots in her knee, Dr Shore.  She has not had the heaves for the last two weeks.  Her GB is intact. Chronic bad gas, but she is not sure if it is the ozempic.      She had been followed by another office in the [facility] system, but not by providers in this office.     Past Medical History:    Past Medical History:   Diagnosis Date    Anxiety     H/O echocardiogram 2020    EF 55-60%, Mild MR, TR & LVH.    History of nuclear stress test 2020    EF 69%, ABN, Mild degree of lateral wall ischemia, Exercise induced frequent PVCs and ventricular couplets noted    Hx of exercise stress test 2021    Treadmill    Hyperlipidemia     Hypertension     Nocturia     Obesity     Sleep apnea     Urinary incontinence        Past Surgical History:  Past Surgical History:   Procedure Laterality Date    CARPAL TUNNEL RELEASE Bilateral     CYSTOSCOPY  10/11/2024    with Botox Injection to Bladder    FRACTURE SURGERY      Left ankle     HERNIA REPAIR      [facility] (Dr Fierro)    TUBAL LIGATION         Home Medications:  Current Outpatient Medications   Medication Sig Dispense Refill    amLODIPine (NORVASC) 10 MG tablet Take 1 tablet by mouth daily 90 tablet 1    DULoxetine (CYMBALTA) 30 MG extended release capsule Take 1 capsule by mouth daily 90 capsule 1    losartan (COZAAR) 25 MG tablet Take 1 tablet by mouth daily 90 tablet 1    metoprolol tartrate (LOPRESSOR) 25 MG tablet Take 1 tablet by mouth 2 times daily 180 tablet 1

## 2024-12-16 ENCOUNTER — HOSPITAL ENCOUNTER (OUTPATIENT)
Dept: ULTRASOUND IMAGING | Age: 74
Discharge: HOME | End: 2024-12-16
Payer: MEDICARE

## 2024-12-16 DIAGNOSIS — R11.0 NAUSEA: ICD-10-CM

## 2024-12-16 PROCEDURE — 76705 ECHO EXAM OF ABDOMEN: CPT

## 2024-12-20 ENCOUNTER — TELEPHONE (OUTPATIENT)
Dept: FAMILY MEDICINE CLINIC | Age: 74
End: 2024-12-20

## 2024-12-20 DIAGNOSIS — R11.0 NAUSEA: Primary | ICD-10-CM

## 2024-12-20 NOTE — RESULT ENCOUNTER NOTE
Patient was informed and voiced understanding.  Patient has seen Dr. Keys in the past and would like to see him again

## 2025-04-11 SDOH — ECONOMIC STABILITY: INCOME INSECURITY: IN THE LAST 12 MONTHS, WAS THERE A TIME WHEN YOU WERE NOT ABLE TO PAY THE MORTGAGE OR RENT ON TIME?: NO

## 2025-04-11 SDOH — ECONOMIC STABILITY: TRANSPORTATION INSECURITY
IN THE PAST 12 MONTHS, HAS THE LACK OF TRANSPORTATION KEPT YOU FROM MEDICAL APPOINTMENTS OR FROM GETTING MEDICATIONS?: NO

## 2025-04-11 SDOH — ECONOMIC STABILITY: FOOD INSECURITY: WITHIN THE PAST 12 MONTHS, THE FOOD YOU BOUGHT JUST DIDN'T LAST AND YOU DIDN'T HAVE MONEY TO GET MORE.: NEVER TRUE

## 2025-04-11 SDOH — ECONOMIC STABILITY: FOOD INSECURITY: WITHIN THE PAST 12 MONTHS, YOU WORRIED THAT YOUR FOOD WOULD RUN OUT BEFORE YOU GOT MONEY TO BUY MORE.: NEVER TRUE

## 2025-04-11 SDOH — ECONOMIC STABILITY: TRANSPORTATION INSECURITY
IN THE PAST 12 MONTHS, HAS LACK OF TRANSPORTATION KEPT YOU FROM MEETINGS, WORK, OR FROM GETTING THINGS NEEDED FOR DAILY LIVING?: NO

## 2025-04-11 ASSESSMENT — PATIENT HEALTH QUESTIONNAIRE - PHQ9
4. FEELING TIRED OR HAVING LITTLE ENERGY: SEVERAL DAYS
8. MOVING OR SPEAKING SO SLOWLY THAT OTHER PEOPLE COULD HAVE NOTICED. OR THE OPPOSITE - BEING SO FIDGETY OR RESTLESS THAT YOU HAVE BEEN MOVING AROUND A LOT MORE THAN USUAL: NOT AT ALL
2. FEELING DOWN, DEPRESSED OR HOPELESS: SEVERAL DAYS
3. TROUBLE FALLING OR STAYING ASLEEP: SEVERAL DAYS
SUM OF ALL RESPONSES TO PHQ QUESTIONS 1-9: 9
5. POOR APPETITE OR OVEREATING: NEARLY EVERY DAY
SUM OF ALL RESPONSES TO PHQ QUESTIONS 1-9: 9
1. LITTLE INTEREST OR PLEASURE IN DOING THINGS: MORE THAN HALF THE DAYS
SUM OF ALL RESPONSES TO PHQ QUESTIONS 1-9: 9
5. POOR APPETITE OR OVEREATING: NEARLY EVERY DAY
7. TROUBLE CONCENTRATING ON THINGS, SUCH AS READING THE NEWSPAPER OR WATCHING TELEVISION: NOT AT ALL
6. FEELING BAD ABOUT YOURSELF - OR THAT YOU ARE A FAILURE OR HAVE LET YOURSELF OR YOUR FAMILY DOWN: SEVERAL DAYS
1. LITTLE INTEREST OR PLEASURE IN DOING THINGS: MORE THAN HALF THE DAYS
SUM OF ALL RESPONSES TO PHQ QUESTIONS 1-9: 9
10. IF YOU CHECKED OFF ANY PROBLEMS, HOW DIFFICULT HAVE THESE PROBLEMS MADE IT FOR YOU TO DO YOUR WORK, TAKE CARE OF THINGS AT HOME, OR GET ALONG WITH OTHER PEOPLE: NOT DIFFICULT AT ALL
3. TROUBLE FALLING OR STAYING ASLEEP: SEVERAL DAYS
7. TROUBLE CONCENTRATING ON THINGS, SUCH AS READING THE NEWSPAPER OR WATCHING TELEVISION: NOT AT ALL
9. THOUGHTS THAT YOU WOULD BE BETTER OFF DEAD, OR OF HURTING YOURSELF: NOT AT ALL
6. FEELING BAD ABOUT YOURSELF - OR THAT YOU ARE A FAILURE OR HAVE LET YOURSELF OR YOUR FAMILY DOWN: SEVERAL DAYS
SUM OF ALL RESPONSES TO PHQ9 QUESTIONS 1 & 2: 3
8. MOVING OR SPEAKING SO SLOWLY THAT OTHER PEOPLE COULD HAVE NOTICED. OR THE OPPOSITE, BEING SO FIGETY OR RESTLESS THAT YOU HAVE BEEN MOVING AROUND A LOT MORE THAN USUAL: NOT AT ALL
10. IF YOU CHECKED OFF ANY PROBLEMS, HOW DIFFICULT HAVE THESE PROBLEMS MADE IT FOR YOU TO DO YOUR WORK, TAKE CARE OF THINGS AT HOME, OR GET ALONG WITH OTHER PEOPLE: NOT DIFFICULT AT ALL
9. THOUGHTS THAT YOU WOULD BE BETTER OFF DEAD, OR OF HURTING YOURSELF: NOT AT ALL
4. FEELING TIRED OR HAVING LITTLE ENERGY: SEVERAL DAYS
2. FEELING DOWN, DEPRESSED OR HOPELESS: SEVERAL DAYS
SUM OF ALL RESPONSES TO PHQ QUESTIONS 1-9: 9

## 2025-04-14 ENCOUNTER — OFFICE VISIT (OUTPATIENT)
Dept: FAMILY MEDICINE CLINIC | Age: 75
End: 2025-04-14

## 2025-04-14 VITALS
DIASTOLIC BLOOD PRESSURE: 76 MMHG | HEART RATE: 65 BPM | WEIGHT: 230 LBS | HEIGHT: 61 IN | SYSTOLIC BLOOD PRESSURE: 132 MMHG | OXYGEN SATURATION: 96 % | BODY MASS INDEX: 43.43 KG/M2

## 2025-04-14 DIAGNOSIS — I10 PRIMARY HYPERTENSION: ICD-10-CM

## 2025-04-14 DIAGNOSIS — E66.01 MORBID OBESITY WITH BMI OF 40.0-44.9, ADULT: ICD-10-CM

## 2025-04-14 DIAGNOSIS — E11.9 TYPE 2 DIABETES MELLITUS WITHOUT COMPLICATION, WITHOUT LONG-TERM CURRENT USE OF INSULIN: Primary | ICD-10-CM

## 2025-04-14 RX ORDER — TRIAMTERENE AND HYDROCHLOROTHIAZIDE 37.5; 25 MG/1; MG/1
1 CAPSULE ORAL DAILY
Qty: 90 CAPSULE | Refills: 0 | Status: SHIPPED | OUTPATIENT
Start: 2025-04-14

## 2025-04-14 RX ORDER — PHENTERMINE HYDROCHLORIDE 37.5 MG/1
37.5 CAPSULE ORAL EVERY MORNING
Qty: 30 CAPSULE | Refills: 0 | Status: SHIPPED | OUTPATIENT
Start: 2025-04-14 | End: 2025-05-14

## 2025-04-24 ENCOUNTER — COMMUNITY OUTREACH (OUTPATIENT)
Dept: FAMILY MEDICINE CLINIC | Age: 75
End: 2025-04-24

## 2025-06-24 DIAGNOSIS — F41.9 ANXIETY: ICD-10-CM

## 2025-06-24 DIAGNOSIS — I10 PRIMARY HYPERTENSION: ICD-10-CM

## 2025-06-24 DIAGNOSIS — I47.19 ATRIAL TACHYCARDIA: ICD-10-CM

## 2025-06-24 RX ORDER — METOPROLOL TARTRATE 25 MG/1
25 TABLET, FILM COATED ORAL 2 TIMES DAILY
Qty: 180 TABLET | Refills: 1 | Status: SHIPPED | OUTPATIENT
Start: 2025-06-24

## 2025-06-24 RX ORDER — DULOXETIN HYDROCHLORIDE 30 MG/1
30 CAPSULE, DELAYED RELEASE ORAL DAILY
Qty: 90 CAPSULE | Refills: 1 | Status: SHIPPED | OUTPATIENT
Start: 2025-06-24

## 2025-06-24 RX ORDER — ROSUVASTATIN CALCIUM 5 MG/1
5 TABLET, COATED ORAL DAILY
Qty: 90 TABLET | Refills: 1 | Status: SHIPPED | OUTPATIENT
Start: 2025-06-24

## 2025-06-24 RX ORDER — AMLODIPINE BESYLATE 10 MG/1
10 TABLET ORAL DAILY
Qty: 90 TABLET | Refills: 1 | Status: SHIPPED | OUTPATIENT
Start: 2025-06-24

## 2025-06-24 RX ORDER — LOSARTAN POTASSIUM 25 MG/1
25 TABLET ORAL DAILY
Qty: 90 TABLET | Refills: 1 | Status: SHIPPED | OUTPATIENT
Start: 2025-06-24

## 2025-06-26 ENCOUNTER — TELEPHONE (OUTPATIENT)
Dept: FAMILY MEDICINE CLINIC | Age: 75
End: 2025-06-26

## 2025-06-26 NOTE — TELEPHONE ENCOUNTER
Patient said she is going to be starting a new medication she received through the mail called Terzepatide she ordered through [email or web address] and received through [employer].  Her question is \"if I'm taking Terzepatide should I stop taking Tradjenta?\"  Please call and let her know.  She would like to start taking the new medication starting July 1, 2025.

## 2025-07-07 DIAGNOSIS — E11.9 TYPE 2 DIABETES MELLITUS WITHOUT COMPLICATION, WITHOUT LONG-TERM CURRENT USE OF INSULIN (HCC): ICD-10-CM

## 2025-07-07 DIAGNOSIS — I10 PRIMARY HYPERTENSION: ICD-10-CM

## 2025-07-08 ENCOUNTER — RESULTS FOLLOW-UP (OUTPATIENT)
Dept: FAMILY MEDICINE CLINIC | Age: 75
End: 2025-07-08

## 2025-07-08 LAB
CHOLEST SERPL-MCNC: 155 MG/DL (ref 0–199)
EST. AVERAGE GLUCOSE BLD GHB EST-MCNC: 125.5 MG/DL
HBA1C MFR BLD: 6 %
HDLC SERPL-MCNC: 49 MG/DL (ref 40–60)
LDLC SERPL CALC-MCNC: 70 MG/DL
TRIGL SERPL-MCNC: 181 MG/DL (ref 0–150)
VLDLC SERPL CALC-MCNC: 36 MG/DL

## 2025-07-10 ENCOUNTER — PATIENT MESSAGE (OUTPATIENT)
Dept: FAMILY MEDICINE CLINIC | Age: 75
End: 2025-07-10

## 2025-07-10 DIAGNOSIS — F41.9 ANXIETY: ICD-10-CM

## 2025-07-10 DIAGNOSIS — I47.19 ATRIAL TACHYCARDIA: ICD-10-CM

## 2025-07-10 DIAGNOSIS — I10 PRIMARY HYPERTENSION: ICD-10-CM

## 2025-07-11 RX ORDER — ROSUVASTATIN CALCIUM 5 MG/1
5 TABLET, COATED ORAL DAILY
Qty: 90 TABLET | Refills: 1 | Status: SHIPPED | OUTPATIENT
Start: 2025-07-11

## 2025-07-11 RX ORDER — TRIAMTERENE AND HYDROCHLOROTHIAZIDE 37.5; 25 MG/1; MG/1
1 CAPSULE ORAL DAILY
Qty: 90 CAPSULE | Refills: 1 | Status: SHIPPED | OUTPATIENT
Start: 2025-07-11

## 2025-07-11 RX ORDER — DULOXETIN HYDROCHLORIDE 30 MG/1
30 CAPSULE, DELAYED RELEASE ORAL DAILY
Qty: 90 CAPSULE | Refills: 1 | Status: SHIPPED | OUTPATIENT
Start: 2025-07-11

## 2025-07-11 RX ORDER — METOPROLOL TARTRATE 25 MG/1
25 TABLET, FILM COATED ORAL 2 TIMES DAILY
Qty: 180 TABLET | Refills: 1 | Status: SHIPPED | OUTPATIENT
Start: 2025-07-11

## 2025-07-11 RX ORDER — AMLODIPINE BESYLATE 10 MG/1
10 TABLET ORAL DAILY
Qty: 90 TABLET | Refills: 1 | Status: SHIPPED | OUTPATIENT
Start: 2025-07-11

## 2025-07-11 RX ORDER — LOSARTAN POTASSIUM 25 MG/1
25 TABLET ORAL DAILY
Qty: 90 TABLET | Refills: 1 | Status: SHIPPED | OUTPATIENT
Start: 2025-07-11

## 2025-08-01 ENCOUNTER — OFFICE VISIT (OUTPATIENT)
Dept: FAMILY MEDICINE CLINIC | Age: 75
End: 2025-08-01

## 2025-08-01 VITALS
WEIGHT: 234.6 LBS | SYSTOLIC BLOOD PRESSURE: 122 MMHG | HEIGHT: 61 IN | HEART RATE: 79 BPM | BODY MASS INDEX: 44.29 KG/M2 | DIASTOLIC BLOOD PRESSURE: 68 MMHG | OXYGEN SATURATION: 96 %

## 2025-08-01 DIAGNOSIS — E11.22 TYPE 2 DIABETES MELLITUS WITH STAGE 3B CHRONIC KIDNEY DISEASE, WITHOUT LONG-TERM CURRENT USE OF INSULIN (HCC): ICD-10-CM

## 2025-08-01 DIAGNOSIS — Z00.00 MEDICARE ANNUAL WELLNESS VISIT, SUBSEQUENT: Primary | ICD-10-CM

## 2025-08-01 DIAGNOSIS — G47.33 OSA (OBSTRUCTIVE SLEEP APNEA): ICD-10-CM

## 2025-08-01 DIAGNOSIS — I10 PRIMARY HYPERTENSION: ICD-10-CM

## 2025-08-01 DIAGNOSIS — N18.32 TYPE 2 DIABETES MELLITUS WITH STAGE 3B CHRONIC KIDNEY DISEASE, WITHOUT LONG-TERM CURRENT USE OF INSULIN (HCC): ICD-10-CM

## 2025-08-01 ASSESSMENT — PATIENT HEALTH QUESTIONNAIRE - PHQ9
1. LITTLE INTEREST OR PLEASURE IN DOING THINGS: SEVERAL DAYS
SUM OF ALL RESPONSES TO PHQ QUESTIONS 1-9: 2
2. FEELING DOWN, DEPRESSED OR HOPELESS: SEVERAL DAYS
SUM OF ALL RESPONSES TO PHQ QUESTIONS 1-9: 2

## 2025-08-01 ASSESSMENT — LIFESTYLE VARIABLES
HOW OFTEN DO YOU HAVE A DRINK CONTAINING ALCOHOL: NEVER
HOW MANY STANDARD DRINKS CONTAINING ALCOHOL DO YOU HAVE ON A TYPICAL DAY: PATIENT DOES NOT DRINK

## 2025-08-01 NOTE — PROGRESS NOTES
Outpatient Clinic Visit Note    Patient: Brittany Negrete  : 1950 (74 y.o.)  Date: 2025    CC:  Chief Complaint   Patient presents with    4 month follow up     No complaints     Medicare AWV     Answered         HPI: AWV questions were done under my supervision.  She had an eye checkup in April.      She relates that she is still following with urology for her urinary urgency.      Past Medical History:    Past Medical History:   Diagnosis Date    Anxiety     Arthritis     Fractures     H/O echocardiogram 2020    EF 55-60%, Mild MR, TR & LVH.    History of nuclear stress test 2020    EF 69%, ABN, Mild degree of lateral wall ischemia, Exercise induced frequent PVCs and ventricular couplets noted    Hx of exercise stress test 2021    Treadmill    Hyperlipidemia     Hypertension     Nocturia     Obesity     Sleep apnea     Urinary incontinence        Past Surgical History:  Past Surgical History:   Procedure Laterality Date    ANKLE FRACTURE SURGERY  2008    CARPAL TUNNEL RELEASE Bilateral     CYSTOSCOPY  10/11/2024    with Botox Injection to Bladder    FRACTURE SURGERY      Left ankle     HERNIA REPAIR      [facility] (Dr Fierro)    TUBAL LIGATION         Home Medications:  Current Outpatient Medications   Medication Sig Dispense Refill    amLODIPine (NORVASC) 10 MG tablet Take 1 tablet by mouth daily 90 tablet 1    DULoxetine (CYMBALTA) 30 MG extended release capsule Take 1 capsule by mouth daily 90 capsule 1    losartan (COZAAR) 25 MG tablet Take 1 tablet by mouth daily 90 tablet 1    metoprolol tartrate (LOPRESSOR) 25 MG tablet Take 1 tablet by mouth 2 times daily 180 tablet 1    rosuvastatin (CRESTOR) 5 MG tablet Take 1 tablet by mouth daily 90 tablet 1    triamterene-hydroCHLOROthiazide (DYAZIDE) 37.5-25 MG per capsule Take 1 capsule by mouth daily 90 capsule 1    linagliptin (TRADJENTA) 5 MG tablet Take 1 tablet by mouth daily 30 tablet 5    pantoprazole